# Patient Record
Sex: MALE | Race: BLACK OR AFRICAN AMERICAN | NOT HISPANIC OR LATINO | Employment: PART TIME | URBAN - METROPOLITAN AREA
[De-identification: names, ages, dates, MRNs, and addresses within clinical notes are randomized per-mention and may not be internally consistent; named-entity substitution may affect disease eponyms.]

---

## 2022-10-10 ENCOUNTER — OFFICE VISIT (OUTPATIENT)
Dept: FAMILY MEDICINE CLINIC | Facility: CLINIC | Age: 57
End: 2022-10-10

## 2022-10-10 ENCOUNTER — HOSPITAL ENCOUNTER (OUTPATIENT)
Dept: GENERAL RADIOLOGY | Facility: HOSPITAL | Age: 57
Discharge: HOME | End: 2022-10-10
Admitting: NURSE PRACTITIONER

## 2022-10-10 VITALS
SYSTOLIC BLOOD PRESSURE: 122 MMHG | DIASTOLIC BLOOD PRESSURE: 82 MMHG | WEIGHT: 183 LBS | OXYGEN SATURATION: 96 % | HEIGHT: 77 IN | BODY MASS INDEX: 21.61 KG/M2 | HEART RATE: 74 BPM

## 2022-10-10 DIAGNOSIS — R06.2 WHEEZING: ICD-10-CM

## 2022-10-10 DIAGNOSIS — M25.521 ELBOW PAIN, RIGHT: ICD-10-CM

## 2022-10-10 DIAGNOSIS — M25.551 HIP PAIN, ACUTE, RIGHT: ICD-10-CM

## 2022-10-10 DIAGNOSIS — J01.00 ACUTE NON-RECURRENT MAXILLARY SINUSITIS: Primary | ICD-10-CM

## 2022-10-10 PROCEDURE — 73502 X-RAY EXAM HIP UNI 2-3 VIEWS: CPT

## 2022-10-10 PROCEDURE — 71046 X-RAY EXAM CHEST 2 VIEWS: CPT

## 2022-10-10 PROCEDURE — 99203 OFFICE O/P NEW LOW 30 MIN: CPT | Performed by: NURSE PRACTITIONER

## 2022-10-10 RX ORDER — METHYLPREDNISOLONE 4 MG/1
TABLET ORAL
Qty: 1 EACH | Refills: 0 | Status: SHIPPED | OUTPATIENT
Start: 2022-10-10 | End: 2022-10-24

## 2022-10-10 RX ORDER — AMOXICILLIN 500 MG/1
1000 CAPSULE ORAL 2 TIMES DAILY
Qty: 40 CAPSULE | Refills: 0 | Status: SHIPPED | OUTPATIENT
Start: 2022-10-10 | End: 2022-10-20

## 2022-10-10 NOTE — PROGRESS NOTES
"Chief Complaint  Labs Only, Elbow Injury, and Hip Pain (Pt states he having chronic pain in right hip.)    Subjective        Stan Israel Jr. presents to [facility]  History of Present Illness  Pt is here today to establish care. He has history of HTN, anxiety/depression. He has not seen pcp in about 5 years. He states he has not been able to get medications and has been off of them for a while. He also states that he feels better since he is not taking medications.     He is experiencing pain in his elbow for about 3-4 days with weather changes. This pain occurs when he tries to move of lift something. It is a sharp pain and described as how it feels when you hit your funny bone. He has been taking ibuprofen and states that is sometimes helps. He is also experiencing pain in left hip that has been going on for about 4 months. Increased pain with walking or mobility. He reports pain that shoots to knee. No known injuries to elbow or hip.     Last 2-3 weeks he has been experiencing congestion and green mucus. In 2020 he had necrotizing pneumonia. He was hospitalized at [place]. He reports that he had negative covid test last week. He reports he is coughing up green mucus and has green nasal drainage.       Objective   Vital Signs:  /82   Pulse 74   Ht 194.3 cm (76.5\")   Wt 83 kg (183 lb)   SpO2 96%   BMI 21.99 kg/m²   Estimated body mass index is 21.99 kg/m² as calculated from the following:    Height as of this encounter: 194.3 cm (76.5\").    Weight as of this encounter: 83 kg (183 lb).    BMI is within normal parameters. No other follow-up for BMI required.    Physical Exam  Vitals reviewed.   Constitutional:       Appearance: Normal appearance.   HENT:      Nose: Congestion present.      Right Sinus: Maxillary sinus tenderness present.   Eyes:      Conjunctiva/sclera: Conjunctivae normal.   Cardiovascular:      Rate and Rhythm: Normal rate.      Heart sounds: " Normal heart sounds.   Pulmonary:      Breath sounds: Examination of the right-upper field reveals rhonchi. Examination of the right-middle field reveals rhonchi. Examination of the right-lower field reveals rhonchi. Rhonchi present.   Musculoskeletal:         General: Normal range of motion.      Cervical back: Normal range of motion and neck supple.   Skin:     General: Skin is warm.   Neurological:      Mental Status: He is alert and oriented to person, place, and time.   Psychiatric:         Mood and Affect: Mood normal.         Behavior: Behavior normal.         Thought Content: Thought content normal.        Result Review :                Assessment and Plan   Diagnoses and all orders for this visit:    1. Acute non-recurrent maxillary sinusitis (Primary)  -     amoxicillin (AMOXIL) 500 MG capsule; Take 2 capsules by mouth 2 (Two) Times a Day for 10 days.  Dispense: 40 capsule; Refill: 0    2. Elbow pain, right  -     methylPREDNISolone (MEDROL) 4 MG dose pack; Take as directed on package instructions.  Dispense: 1 each; Refill: 0    3. Hip pain, acute, right  -     XR Hip With or Without Pelvis 2 - 3 View Right; Future    4. Wheezing  -     XR Chest PA & Lateral; Future  -     methylPREDNISolone (MEDROL) 4 MG dose pack; Take as directed on package instructions.  Dispense: 1 each; Refill: 0             Follow Up   Return in about 2 weeks (around 10/24/2022) for Annual.  Patient was given instructions and counseling regarding his condition or for health maintenance advice. Please see specific information pulled into the AVS if appropriate.

## 2022-10-10 NOTE — PROGRESS NOTES
"Chief Complaint  Annual Exam, Labs Only, Elbow Injury, and Hip Pain (Pt states he having chronic pain in right hip.)    Subjective          Stan Israel presents to [facility] for   History of Present Illness    Objective   Vital Signs:   Vitals:    10/10/22 1112   BP: 122/82   Pulse: 74   SpO2: 96%   Weight: 83 kg (183 lb)   Height: 194.3 cm (76.5\")     Body mass index is 21.99 kg/m².    BMI is within normal parameters. No other follow-up for BMI required.          Physical Exam   Result Review :                Immunization History   Administered Date(s) Administered   • COVID-19 (PFIZER) PURPLE CAP 04/15/2021   • Flu Vaccine Split Quad 10/03/2019   • FluLaval/Fluzone >6mos 11/18/2014   • Influenza Injectable Mdck Pf Quad 11/18/2014, 11/29/2020   • Pneumococcal Polysaccharide (PPSV23) 11/18/2014   • Shingrix 09/11/2018, 12/11/2018   • Tdap 06/09/2018, 07/04/2018       Health Maintenance   Topic Date Due   • INFLUENZA VACCINE  08/01/2022   • TDAP/TD VACCINES (3 - Td or Tdap) 07/04/2028   • ZOSTER VACCINE  Completed            Assessment and Plan    There are no diagnoses linked to this encounter.    Counseling/anticipatory guidance: Nutrition, physical activity, healthy weight, dental health, mental health, eye exam, immunizations, screenings      Follow Up   No follow-ups on file.  Patient was given instructions and counseling regarding his condition or for health maintenance advice. Please see specific information pulled into the AVS if appropriate.        "

## 2022-10-11 ENCOUNTER — TELEPHONE (OUTPATIENT)
Dept: FAMILY MEDICINE CLINIC | Facility: CLINIC | Age: 57
End: 2022-10-11

## 2022-10-11 DIAGNOSIS — F17.200 CURRENT EVERY DAY SMOKER: Primary | ICD-10-CM

## 2022-10-11 DIAGNOSIS — M25.551 HIP PAIN, CHRONIC, RIGHT: ICD-10-CM

## 2022-10-11 DIAGNOSIS — G89.29 HIP PAIN, CHRONIC, RIGHT: ICD-10-CM

## 2022-10-11 NOTE — TELEPHONE ENCOUNTER
----- Message from ROSALIO Giraldo sent at 10/11/2022  8:43 AM EDT -----  I have the results of your x-rays. Your hip x-ray indicates some degenerative changes. This is likely due to arthritis. I would like to send you to Rheumatology for follow up.     The chest x-ray indicated some chronic changes as well. I would like to order a CT of the chest due to your smoking history. I will also place a referral to Pulmonology. I will place all of those orders today, and you should hear from someone in the next 1-2 weeks to make those appointments.   Hub can relay and document

## 2022-10-11 NOTE — TELEPHONE ENCOUNTER
Caller: Stan Israel Jr.    Relationship: Self    Best call back number: [phone]    What was the call regarding: HUB RELAYED MESSAGE TO BOTH PATIENT AND WIFE PER PATIENT REQUEST. PATIENT VERBALIZED UNDERSTANDING AND CONFIRMED THEY WILL EXPECT 3 CALLS TO SCHEDULE CT, PULMONOLOGY, AND RHEUMATOLOGY    Do you require a callback: NO, PATIENT WILL WAIT 1-2 WEEKS TO HEAR BACK TO SCHEDULE 3 APPOINTMENTS

## 2022-10-12 ENCOUNTER — TELEPHONE (OUTPATIENT)
Dept: FAMILY MEDICINE CLINIC | Facility: CLINIC | Age: 57
End: 2022-10-12

## 2022-10-12 DIAGNOSIS — J01.00 ACUTE NON-RECURRENT MAXILLARY SINUSITIS: ICD-10-CM

## 2022-10-12 RX ORDER — AMOXICILLIN 500 MG/1
1000 CAPSULE ORAL 2 TIMES DAILY
Qty: 40 CAPSULE | Refills: 0 | Status: CANCELLED | OUTPATIENT
Start: 2022-10-12 | End: 2022-10-22

## 2022-10-12 NOTE — TELEPHONE ENCOUNTER
Caller: Stan Israel Jr.    Relationship: Self    Best call back number: [phone]    What is the best time to reach you: ANYTIME    Who are you requesting to speak with (clinical staff, provider,  specific staff member): CLINICAL STAFF    Do you know the name of the person who called: SELF    What was the call regarding: PATIENT STATES THAT EVERY TIME HE TAKES THE amoxicillin (AMOXIL) 500 MG capsule HE VOMITS. PATIENT WOULD LIKE A CALL ABOUT THIS.    Do you require a callback: YES

## 2022-10-12 NOTE — TELEPHONE ENCOUNTER
Contacted patient to discuss further, he reports that he has taken 3 doses on a full stomach still experiences nausea and vomiting. He reports that his sinus symptoms have improved and he will stop meds       He will follow up as needed

## 2022-10-12 NOTE — TELEPHONE ENCOUNTER
Attempted to contact, no answer left message asking for return call    Is he taking on an empty stomach? He needs to eat prior to taking it. If he is eating and still vomiting, or he is having other symptoms after taking, I would recommend that he stop the medication.    Hub can relay and document.

## 2022-10-24 ENCOUNTER — OFFICE VISIT (OUTPATIENT)
Dept: FAMILY MEDICINE CLINIC | Facility: CLINIC | Age: 57
End: 2022-10-24

## 2022-10-24 ENCOUNTER — TELEPHONE (OUTPATIENT)
Dept: FAMILY MEDICINE CLINIC | Facility: CLINIC | Age: 57
End: 2022-10-24

## 2022-10-24 ENCOUNTER — APPOINTMENT (OUTPATIENT)
Dept: CT IMAGING | Facility: HOSPITAL | Age: 57
End: 2022-10-24

## 2022-10-24 ENCOUNTER — LAB (OUTPATIENT)
Dept: LAB | Facility: HOSPITAL | Age: 57
End: 2022-10-24

## 2022-10-24 VITALS
BODY MASS INDEX: 21.75 KG/M2 | OXYGEN SATURATION: 98 % | SYSTOLIC BLOOD PRESSURE: 118 MMHG | HEIGHT: 76 IN | HEART RATE: 79 BPM | DIASTOLIC BLOOD PRESSURE: 44 MMHG | WEIGHT: 178.6 LBS

## 2022-10-24 DIAGNOSIS — Z13.0 SCREENING FOR DEFICIENCY ANEMIA: ICD-10-CM

## 2022-10-24 DIAGNOSIS — Z23 NEED FOR IMMUNIZATION AGAINST INFLUENZA: ICD-10-CM

## 2022-10-24 DIAGNOSIS — Z00.00 ANNUAL PHYSICAL EXAM: Primary | ICD-10-CM

## 2022-10-24 DIAGNOSIS — T78.40XA ALLERGY, INITIAL ENCOUNTER: ICD-10-CM

## 2022-10-24 DIAGNOSIS — Z13.220 SCREENING FOR CHOLESTEROL LEVEL: ICD-10-CM

## 2022-10-24 DIAGNOSIS — Z12.11 SCREENING FOR COLON CANCER: ICD-10-CM

## 2022-10-24 DIAGNOSIS — R53.83 FATIGUE, UNSPECIFIED TYPE: ICD-10-CM

## 2022-10-24 DIAGNOSIS — Z00.00 ANNUAL PHYSICAL EXAM: ICD-10-CM

## 2022-10-24 DIAGNOSIS — Z71.6 ENCOUNTER FOR SMOKING CESSATION COUNSELING: ICD-10-CM

## 2022-10-24 LAB
25(OH)D3 SERPL-MCNC: 21.4 NG/ML (ref 30–100)
ALBUMIN SERPL-MCNC: 4.5 G/DL (ref 3.5–5.2)
ALBUMIN/GLOB SERPL: 1.4 G/DL
ALP SERPL-CCNC: 120 U/L (ref 39–117)
ALT SERPL W P-5'-P-CCNC: 19 U/L (ref 1–41)
ANION GAP SERPL CALCULATED.3IONS-SCNC: 10.7 MMOL/L (ref 5–15)
AST SERPL-CCNC: 21 U/L (ref 1–40)
BASOPHILS # BLD AUTO: 0.05 10*3/MM3 (ref 0–0.2)
BASOPHILS NFR BLD AUTO: 0.5 % (ref 0–1.5)
BILIRUB SERPL-MCNC: 0.6 MG/DL (ref 0–1.2)
BUN SERPL-MCNC: 7 MG/DL (ref 6–20)
BUN/CREAT SERPL: 8.2 (ref 7–25)
CALCIUM SPEC-SCNC: 9.9 MG/DL (ref 8.6–10.5)
CHLORIDE SERPL-SCNC: 101 MMOL/L (ref 98–107)
CHOLEST SERPL-MCNC: 213 MG/DL (ref 0–200)
CO2 SERPL-SCNC: 25.3 MMOL/L (ref 22–29)
CREAT SERPL-MCNC: 0.85 MG/DL (ref 0.76–1.27)
DEPRECATED RDW RBC AUTO: 41.5 FL (ref 37–54)
EGFRCR SERPLBLD CKD-EPI 2021: 102 ML/MIN/1.73
EOSINOPHIL # BLD AUTO: 0.06 10*3/MM3 (ref 0–0.4)
EOSINOPHIL NFR BLD AUTO: 0.6 % (ref 0.3–6.2)
ERYTHROCYTE [DISTWIDTH] IN BLOOD BY AUTOMATED COUNT: 12.4 % (ref 12.3–15.4)
GLOBULIN UR ELPH-MCNC: 3.2 GM/DL
GLUCOSE SERPL-MCNC: 93 MG/DL (ref 65–99)
HCT VFR BLD AUTO: 48.1 % (ref 37.5–51)
HDLC SERPL-MCNC: 91 MG/DL (ref 40–60)
HGB BLD-MCNC: 15.9 G/DL (ref 13–17.7)
IMM GRANULOCYTES # BLD AUTO: 0.03 10*3/MM3 (ref 0–0.05)
IMM GRANULOCYTES NFR BLD AUTO: 0.3 % (ref 0–0.5)
LDLC SERPL CALC-MCNC: 108 MG/DL (ref 0–100)
LDLC/HDLC SERPL: 1.16 {RATIO}
LYMPHOCYTES # BLD AUTO: 1.65 10*3/MM3 (ref 0.7–3.1)
LYMPHOCYTES NFR BLD AUTO: 15.7 % (ref 19.6–45.3)
MCH RBC QN AUTO: 30.5 PG (ref 26.6–33)
MCHC RBC AUTO-ENTMCNC: 33.1 G/DL (ref 31.5–35.7)
MCV RBC AUTO: 92.3 FL (ref 79–97)
MONOCYTES # BLD AUTO: 0.76 10*3/MM3 (ref 0.1–0.9)
MONOCYTES NFR BLD AUTO: 7.2 % (ref 5–12)
NEUTROPHILS NFR BLD AUTO: 7.97 10*3/MM3 (ref 1.7–7)
NEUTROPHILS NFR BLD AUTO: 75.7 % (ref 42.7–76)
NRBC BLD AUTO-RTO: 0 /100 WBC (ref 0–0.2)
PLATELET # BLD AUTO: 252 10*3/MM3 (ref 140–450)
PMV BLD AUTO: 11.2 FL (ref 6–12)
POTASSIUM SERPL-SCNC: 4.7 MMOL/L (ref 3.5–5.2)
PROT SERPL-MCNC: 7.7 G/DL (ref 6–8.5)
RBC # BLD AUTO: 5.21 10*6/MM3 (ref 4.14–5.8)
SODIUM SERPL-SCNC: 137 MMOL/L (ref 136–145)
TRIGL SERPL-MCNC: 83 MG/DL (ref 0–150)
TSH SERPL DL<=0.05 MIU/L-ACNC: 0.99 UIU/ML (ref 0.27–4.2)
VLDLC SERPL-MCNC: 14 MG/DL (ref 5–40)
WBC NRBC COR # BLD: 10.52 10*3/MM3 (ref 3.4–10.8)

## 2022-10-24 PROCEDURE — 80061 LIPID PANEL: CPT

## 2022-10-24 PROCEDURE — 99396 PREV VISIT EST AGE 40-64: CPT | Performed by: NURSE PRACTITIONER

## 2022-10-24 PROCEDURE — 82306 VITAMIN D 25 HYDROXY: CPT

## 2022-10-24 PROCEDURE — 84443 ASSAY THYROID STIM HORMONE: CPT

## 2022-10-24 PROCEDURE — 85025 COMPLETE CBC W/AUTO DIFF WBC: CPT

## 2022-10-24 PROCEDURE — 80053 COMPREHEN METABOLIC PANEL: CPT

## 2022-10-24 RX ORDER — FLUTICASONE PROPIONATE 50 MCG
2 SPRAY, SUSPENSION (ML) NASAL DAILY
Qty: 18.2 ML | Refills: 1 | Status: SHIPPED | OUTPATIENT
Start: 2022-10-24 | End: 2022-12-19

## 2022-10-24 RX ORDER — ALBUTEROL SULFATE 90 UG/1
2 AEROSOL, METERED RESPIRATORY (INHALATION) EVERY 4 HOURS PRN
Qty: 18 G | Refills: 1 | Status: SHIPPED | OUTPATIENT
Start: 2022-10-24 | End: 2023-02-10 | Stop reason: SDUPTHER

## 2022-10-24 RX ORDER — FEXOFENADINE HCL 180 MG/1
180 TABLET ORAL DAILY
Qty: 30 TABLET | Refills: 0 | Status: SHIPPED | OUTPATIENT
Start: 2022-10-24 | End: 2022-11-21 | Stop reason: SDUPTHER

## 2022-10-24 RX ORDER — NICOTINE 21 MG/24HR
1 PATCH, TRANSDERMAL 24 HOURS TRANSDERMAL EVERY 24 HOURS
Qty: 28 PATCH | Refills: 0 | Status: SHIPPED | OUTPATIENT
Start: 2022-10-24 | End: 2022-11-14

## 2022-10-24 NOTE — TELEPHONE ENCOUNTER
Caller: Stan Israel Jr.    Relationship: Self    Best call back number: [phone]    What medication are you requesting: SOMETHING FOR RESTLESS LEG SYNDROME    If a prescription is needed, what is your preferred pharmacy and phone number:    [facility] [phone]  Additional notes:  AT TODAY'S APPOINTMENT WAS DISCUSSED POSSIBLY PRESCRIBING SOMETHING FOR PATIENT AND HIS RESTLESS LEG SYNDROME HOWEVER NOTHING HAS BEEN SENT TO PHARMACY. PLEASE ADVISE

## 2022-10-24 NOTE — PROGRESS NOTES
"Chief Complaint  Annual Exam, Nasal Congestion (Has green mucus.), Abdominal Pain (Lt side,feels like something is sitting on top of something. Happens when standing and sitting. When smoking a cigar pain worsens. ), and Leg Pain (Lt leg twitches at night when a sleep. )    Subjective          Stan Israel Jr. presents to [facility] for   History of Present Illness  Pt is here today for annual physical. He continues to feel discomfort in the left lower rib cage. He had a chest x-ray previously. Chest CT was ordered. It has been authorized, but not scheduled yet. He is a smoker, but would like help quitting. Will prescribe nicotine patches today. He has an appt with Pulmonary on Monday.   He is having concerns with restless leg syndrome.     Pt needs flu vaccine and PCV 20. Left today before getting vaccines. He can do a walk in visit for both. He does not wish to do covid at this time.    Objective   Vital Signs:   Vitals:    10/24/22 0916   BP: 118/44   Pulse: 79   SpO2: 98%   Weight: 81 kg (178 lb 9.6 oz)   Height: 194.3 cm (76.5\")   PainSc:   7   PainLoc: Abdomen     Body mass index is 21.46 kg/m².    BMI is within normal parameters. No other follow-up for BMI required.    Physical Exam  Vitals reviewed.   Constitutional:       Appearance: Normal appearance. He is normal weight.   HENT:      Right Ear: Tympanic membrane, ear canal and external ear normal.      Left Ear: Tympanic membrane, ear canal and external ear normal.      Nose: Nose normal.      Mouth/Throat:      Mouth: Mucous membranes are moist.      Pharynx: Oropharynx is clear.   Eyes:      Extraocular Movements: Extraocular movements intact.      Conjunctiva/sclera: Conjunctivae normal.      Pupils: Pupils are equal, round, and reactive to light.   Cardiovascular:      Rate and Rhythm: Normal rate and regular rhythm.      Heart sounds: Normal heart sounds.   Pulmonary:      Effort: Pulmonary effort is normal.      " Breath sounds: Normal breath sounds.   Abdominal:      General: Abdomen is flat. Bowel sounds are normal.      Palpations: Abdomen is soft.      Tenderness: There is abdominal tenderness in the left upper quadrant and left lower quadrant.   Musculoskeletal:         General: Normal range of motion.      Cervical back: Normal range of motion and neck supple.   Skin:     General: Skin is warm.   Neurological:      Mental Status: He is alert and oriented to person, place, and time.   Psychiatric:         Mood and Affect: Mood normal.         Behavior: Behavior normal.         Thought Content: Thought content normal.        Result Review :                Immunization History   Administered Date(s) Administered   • COVID-19 (PFIZER) PURPLE CAP 04/15/2021   • Flu Vaccine Split Quad 10/03/2019   • FluLaval/Fluzone >6mos 11/18/2014   • Influenza Injectable Mdck Pf Quad 11/18/2014, 11/29/2020   • Influenza, Unspecified 10/03/2019   • Pneumococcal Polysaccharide (PPSV23) 11/18/2014   • Shingrix 09/11/2018, 12/11/2018   • Tdap 06/09/2018, 07/04/2018     Health Maintenance   Topic Date Due   • INFLUENZA VACCINE  08/01/2022   • TDAP/TD VACCINES (3 - Td or Tdap) 07/04/2028   • ZOSTER VACCINE  Completed        Assessment and Plan    Diagnoses and all orders for this visit:    1. Annual physical exam (Primary)  -     Comprehensive Metabolic Panel; Future    2. Need for immunization against influenza  -     Cancel: FluLaval/Fluzone >6 mos ([id number])    3. Screening for colon cancer  -     Amb referral for Screening Colonoscopy    4. Allergy, initial encounter  -     albuterol sulfate  (90 Base) MCG/ACT inhaler; Inhale 2 puffs Every 4 (Four) Hours As Needed for Wheezing.  Dispense: 18 g; Refill: 1  -     fexofenadine (Allegra Allergy) 180 MG tablet; Take 1 tablet by mouth Daily.  Dispense: 30 tablet; Refill: 0  -     fluticasone (Flonase) 50 MCG/ACT nasal spray; 2 sprays into the nostril(s) as directed by provider Daily.   Dispense: 18.2 mL; Refill: 1    5. Fatigue, unspecified type  -     TSH Rfx On Abnormal To Free T4; Future  -     Vitamin D,25-Hydroxy; Future    6. Screening for deficiency anemia  -     CBC Auto Differential; Future    7. Screening for cholesterol level  -     Lipid Panel; Future    8. Encounter for smoking cessation counseling    Other orders  -     nicotine (NICODERM CQ) 21 MG/24HR patch; Place 1 patch on the skin as directed by provider Daily.  Dispense: 28 patch; Refill: 0      Counseling/anticipatory guidance: Nutrition, physical activity, healthy weight, dental health, mental health, eye exam, immunizations, screenings    Discussed age appropriate screenings and immunizations. Pt would like to get immunization, except covid. Will order colonoscopy today.      Follow Up   Return in about 3 months (around 1/24/2023).  Patient was given instructions and counseling regarding his condition or for health maintenance advice. Please see specific information pulled into the AVS if appropriate.

## 2022-10-24 NOTE — TELEPHONE ENCOUNTER
Contacted pt & relayed PCP's message. Pt verbalized understanding and did not have any additional questions at this time.         Sara Claros APRN  You 4 minutes ago (3:47 PM)     We are waiting on lab results. I will let him know once I get the lab results back.

## 2022-10-26 RX ORDER — ROPINIROLE 0.25 MG/1
0.25 TABLET, FILM COATED ORAL NIGHTLY
Qty: 30 TABLET | Refills: 0 | Status: SHIPPED | OUTPATIENT
Start: 2022-10-26 | End: 2022-11-21 | Stop reason: SDUPTHER

## 2022-10-28 ENCOUNTER — TELEPHONE (OUTPATIENT)
Dept: FAMILY MEDICINE CLINIC | Facility: CLINIC | Age: 57
End: 2022-10-28

## 2022-10-31 PROBLEM — J43.2 CENTRILOBULAR EMPHYSEMA: Status: ACTIVE | Noted: 2022-10-31

## 2022-11-07 ENCOUNTER — HOSPITAL ENCOUNTER (OUTPATIENT)
Dept: CT IMAGING | Facility: HOSPITAL | Age: 57
Discharge: HOME | End: 2022-11-07
Admitting: NURSE PRACTITIONER

## 2022-11-07 DIAGNOSIS — F17.200 CURRENT EVERY DAY SMOKER: ICD-10-CM

## 2022-11-07 PROCEDURE — 71250 CT THORAX DX C-: CPT

## 2022-11-14 ENCOUNTER — OFFICE VISIT (OUTPATIENT)
Dept: PULMONOLOGY | Facility: CLINIC | Age: 57
End: 2022-11-14

## 2022-11-14 VITALS
DIASTOLIC BLOOD PRESSURE: 72 MMHG | TEMPERATURE: 97.4 F | HEART RATE: 82 BPM | OXYGEN SATURATION: 97 % | BODY MASS INDEX: 21.87 KG/M2 | HEIGHT: 77 IN | SYSTOLIC BLOOD PRESSURE: 128 MMHG | WEIGHT: 185.2 LBS

## 2022-11-14 DIAGNOSIS — Z72.0 TOBACCO ABUSE: ICD-10-CM

## 2022-11-14 DIAGNOSIS — J43.2 CENTRILOBULAR EMPHYSEMA: Primary | ICD-10-CM

## 2022-11-14 DIAGNOSIS — J30.89 NON-SEASONAL ALLERGIC RHINITIS, UNSPECIFIED TRIGGER: ICD-10-CM

## 2022-11-14 DIAGNOSIS — R05.3 CHRONIC COUGH: ICD-10-CM

## 2022-11-14 PROCEDURE — 94726 PLETHYSMOGRAPHY LUNG VOLUMES: CPT | Performed by: INTERNAL MEDICINE

## 2022-11-14 PROCEDURE — 99204 OFFICE O/P NEW MOD 45 MIN: CPT | Performed by: INTERNAL MEDICINE

## 2022-11-14 PROCEDURE — 94729 DIFFUSING CAPACITY: CPT | Performed by: INTERNAL MEDICINE

## 2022-11-14 PROCEDURE — 94375 RESPIRATORY FLOW VOLUME LOOP: CPT | Performed by: INTERNAL MEDICINE

## 2022-11-14 RX ORDER — TIOTROPIUM BROMIDE AND OLODATEROL 3.124; 2.736 UG/1; UG/1
2 SPRAY, METERED RESPIRATORY (INHALATION)
Qty: 4 G | Refills: 11 | Status: SHIPPED | OUTPATIENT
Start: 2022-11-14

## 2022-11-14 NOTE — PROGRESS NOTES
New Patient Pulmonary Office Visit      Patient Name: Stan Israel Jr.    Referring Physician: Sara Claros APRN    Chief Complaint:    Chief Complaint   Patient presents with   • Shortness of Breath       History of Present Illness: Stan Israel Jr. is a 57 y.o. male who is here today to establish care with Pulmonary.  Patient's past medical history significant for allergic rhinitis, tobacco abuse, history of necrotizing pneumonia (2020) and emphysema.  He was referred to pulmonary for management of emphysema.  Patient states that he has a long smoking history of 30-pack-year smoking history, that has been split mainly between cigars and cigarettes but previously was smoking 1 pack/day but when they got too expensive he switched over to cigars a couple years ago.  He states that he is short of breath when he exerts himself.  This is been like this for couple years now.  It all started after he had a bad case of necrotizing pneumonia back in 2020.  He coughs on a daily basis and has mucus production.  Denies any chest pain, nausea, fever, or chills.  No other acute complaints.    Review of Systems:   Review of Systems   Constitutional: Negative for activity change, appetite change, chills and diaphoresis.   HENT: Negative for congestion, postnasal drip, sinus pressure and voice change.    Eyes: Negative for blurred vision.   Respiratory: Positive for cough and shortness of breath. Negative for wheezing.    Cardiovascular: Negative for chest pain.   Gastrointestinal: Negative for abdominal pain.   Musculoskeletal: Negative for myalgias.   Skin: Negative for color change and dry skin.   Allergic/Immunologic: Negative for environmental allergies.   Neurological: Negative for weakness and confusion.   Hematological: Negative for adenopathy.   Psychiatric/Behavioral: Negative for sleep disturbance and depressed mood.       Past Medical History:   Past Medical History:   Diagnosis Date   • Anxiety    •  "Depression        Past Surgical History:   Past Surgical History:   Procedure Laterality Date   • HERNIA REPAIR         Family History:   Family History   Problem Relation Age of Onset   • Cancer Mother    • Cancer Father    • Cancer Sister        Social History:   Social History     Socioeconomic History   • Marital status: Single   Tobacco Use   • Smoking status: Every Day     Packs/day: 1.00     Years: 30.00     Pack years: 30.00     Types: Cigars, Cigarettes     Start date: 1/1/1982     Passive exposure: Never   • Smokeless tobacco: Never   Vaping Use   • Vaping Use: Never used   Substance and Sexual Activity   • Alcohol use: Yes     Alcohol/week: 2.0 standard drinks     Types: 2 Cans of beer per week   • Drug use: Yes     Types: Marijuana   • Sexual activity: Defer       Medications:     Current Outpatient Medications:   •  albuterol sulfate  (90 Base) MCG/ACT inhaler, Inhale 2 puffs Every 4 (Four) Hours As Needed for Wheezing., Disp: 18 g, Rfl: 1  •  fexofenadine (Allegra Allergy) 180 MG tablet, Take 1 tablet by mouth Daily., Disp: 30 tablet, Rfl: 0  •  fluticasone (Flonase) 50 MCG/ACT nasal spray, 2 sprays into the nostril(s) as directed by provider Daily., Disp: 18.2 mL, Rfl: 1  •  rOPINIRole (REQUIP) 0.25 MG tablet, Take 1 tablet by mouth Every Night. Take 1 hour before bedtime., Disp: 30 tablet, Rfl: 0  •  tiotropium bromide-olodaterol (Stiolto Respimat) 2.5-2.5 MCG/ACT aerosol solution inhaler, Inhale 2 puffs Daily., Disp: 4 g, Rfl: 11    Allergies:   No Known Allergies    Physical Exam:  Vital Signs:   Vitals:    11/14/22 0844   BP: 128/72   Pulse: 82   Temp: 97.4 °F (36.3 °C)   SpO2: 97%  Comment: resting, room air   Weight: 84 kg (185 lb 3.2 oz)   Height: 194.3 cm (76.5\")       Physical Exam  Vitals and nursing note reviewed.   Constitutional:       General: He is not in acute distress.     Appearance: He is well-developed and normal weight. He is not ill-appearing or toxic-appearing. "   Cardiovascular:      Rate and Rhythm: Normal rate and regular rhythm.      Pulses: Normal pulses.      Heart sounds: Normal heart sounds. No murmur heard.    No gallop.   Pulmonary:      Effort: Pulmonary effort is normal.      Breath sounds: Normal breath sounds. No wheezing, rhonchi or rales.   Musculoskeletal:      Right lower leg: No edema.      Left lower leg: No edema.   Neurological:      Mental Status: He is alert.         Immunization History   Administered Date(s) Administered   • COVID-19 (PFIZER) PURPLE CAP 04/15/2021   • Flu Vaccine Split Quad 10/03/2019   • FluLaval/Fluzone >6mos 11/18/2014   • Influenza Injectable Mdck Pf Quad 11/18/2014, 11/29/2020   • Influenza, Unspecified 10/03/2019, 11/05/2022   • Pneumococcal Polysaccharide (PPSV23) 11/18/2014   • Shingrix 09/11/2018, 12/11/2018   • Tdap 06/09/2018, 07/04/2018       Results Review:   - I personally reviewed the pts imaging from CT scan of the chest on 11/7/2022 showed bilateral upper lobe predominant paraseptal emphysema with some linear scarring most notable in the right middle and right lower lobes.  - I personally reviewed the pts PFT from 11/14/2022 showed no obstruction or restriction with a mildly reduced DLCO, FVC 76% FEV1 72%, total lung capacity 84%, DLCO 63%.  - I personally reviewed the pts chart with regards to evaluation by his PCP and recent physical.    Assessment / Plan:   1. Centrilobular emphysema (HCC) (Primary)  -Patient has gold stage 3 class A COPD, PFTs did not show significant obstruction but he has clear emphysema on the CT scan of the chest and symptoms consistent with COPD.  -I will start the patient on Stiolto 2 puffs once daily in addition to albuterol for medication management  -6-minute walk and latest PFTs as noted above  -Patient counseled on monitoring for COPD exacerbation and treatment plan  -Recommend 30 minutes of cardiovascular exercise per day    2. Chronic cough  -Plan to treat allergies and emphysema  as noted.  If there is not any improvement then I will go through a much larger evaluation of chronic cough including postnasal drip work-up and reflux management.    3. Tobacco abuse  - In this visit the patient was advised to stop smoking and was offered tobacco cessation measures and resources, including NRT and/or medication intervention.  I specifically discussed with the patient psychological components to quitting smoking which includes determining why the patient wants to quit smoking, identifying smoking triggers, and figuring out ways to replace those triggers.  Greater than 10 minutes was spent on face-to-face counseling regarding smoking cessation.  Plan to follow-up at the next visit on status cessation success.  Patient was willing to quit at this time.  He is currently on nicotine patches which I would asked him to going to continue.  Goal is to have complete tobacco cessation within 1 year.    4. Non-seasonal allergic rhinitis, unspecified trigger  -Continue Flonase and Allegra, will escalate therapy if the cough does not improve with treatment of his underlying emphysema.      Follow Up:   Return in about 6 months (around 5/14/2023).     KATHIE Kumari, DO  Pulmonary and Critical Care Medicine  Note Electronically Signed    Part of this note may be an electronic transcription/translation of spoken language to printed text using the Dragon Dictation System.

## 2022-11-18 DIAGNOSIS — T78.40XA ALLERGY, INITIAL ENCOUNTER: ICD-10-CM

## 2022-11-18 RX ORDER — ROPINIROLE 0.25 MG/1
0.25 TABLET, FILM COATED ORAL NIGHTLY
Qty: 30 TABLET | Refills: 0 | Status: CANCELLED | OUTPATIENT
Start: 2022-11-18

## 2022-11-18 RX ORDER — FEXOFENADINE HCL 180 MG/1
180 TABLET ORAL DAILY
Qty: 30 TABLET | Refills: 0 | Status: CANCELLED | OUTPATIENT
Start: 2022-11-18

## 2022-11-18 NOTE — TELEPHONE ENCOUNTER
Caller: Stan Israel Jr.    Relationship: Self    Best call back number: [phone]    Requested Prescriptions:   Requested Prescriptions     Pending Prescriptions Disp Refills   • fexofenadine (Allegra Allergy) 180 MG tablet 30 tablet 0     Sig: Take 1 tablet by mouth Daily.   • rOPINIRole (REQUIP) 0.25 MG tablet 30 tablet 0     Sig: Take 1 tablet by mouth Every Night. Take 1 hour before bedtime.      Pharmacy where request should be sent: [id number] [address] AT [facility] [address] [facility] [place] [phone] [facility] [phone] FX     Additional details provided by patient:     PATIENT HAS LESS THAN A 3 DAY SUPPLY LEFT OF BOTH MEDICATIONS     Does the patient have less than a 3 day supply:  [x] Yes  [] No    Milly Duran Rep   11/18/22 14:33 EST

## 2022-11-21 ENCOUNTER — OFFICE VISIT (OUTPATIENT)
Dept: FAMILY MEDICINE CLINIC | Facility: CLINIC | Age: 57
End: 2022-11-21

## 2022-11-21 VITALS
HEIGHT: 76 IN | OXYGEN SATURATION: 97 % | SYSTOLIC BLOOD PRESSURE: 118 MMHG | BODY MASS INDEX: 22.82 KG/M2 | WEIGHT: 187.4 LBS | HEART RATE: 70 BPM | DIASTOLIC BLOOD PRESSURE: 78 MMHG

## 2022-11-21 DIAGNOSIS — R05.1 ACUTE COUGH: Primary | ICD-10-CM

## 2022-11-21 DIAGNOSIS — T78.40XA ALLERGY, INITIAL ENCOUNTER: ICD-10-CM

## 2022-11-21 DIAGNOSIS — S61.211A LACERATION OF LEFT INDEX FINGER WITHOUT FOREIGN BODY WITHOUT DAMAGE TO NAIL, INITIAL ENCOUNTER: ICD-10-CM

## 2022-11-21 DIAGNOSIS — J01.00 ACUTE NON-RECURRENT MAXILLARY SINUSITIS: ICD-10-CM

## 2022-11-21 DIAGNOSIS — G25.81 RESTLESS LEG SYNDROME: ICD-10-CM

## 2022-11-21 LAB
EXPIRATION DATE: NORMAL
FLUAV AG UPPER RESP QL IA.RAPID: NOT DETECTED
FLUBV AG UPPER RESP QL IA.RAPID: NOT DETECTED
INTERNAL CONTROL: NORMAL
Lab: NORMAL
SARS-COV-2 AG UPPER RESP QL IA.RAPID: NOT DETECTED

## 2022-11-21 PROCEDURE — 87428 SARSCOV & INF VIR A&B AG IA: CPT | Performed by: NURSE PRACTITIONER

## 2022-11-21 PROCEDURE — 99213 OFFICE O/P EST LOW 20 MIN: CPT | Performed by: NURSE PRACTITIONER

## 2022-11-21 RX ORDER — ROPINIROLE 0.5 MG/1
0.5 TABLET, FILM COATED ORAL NIGHTLY
Qty: 30 TABLET | Refills: 0 | Status: SHIPPED | OUTPATIENT
Start: 2022-11-21 | End: 2022-12-19

## 2022-11-21 RX ORDER — FEXOFENADINE HCL 180 MG/1
180 TABLET ORAL DAILY
Qty: 30 TABLET | Refills: 2 | Status: SHIPPED | OUTPATIENT
Start: 2022-11-21 | End: 2023-02-10 | Stop reason: SDUPTHER

## 2022-11-21 RX ORDER — AMOXICILLIN AND CLAVULANATE POTASSIUM 875; 125 MG/1; MG/1
1 TABLET, FILM COATED ORAL 2 TIMES DAILY
Qty: 14 TABLET | Refills: 0 | Status: SHIPPED | OUTPATIENT
Start: 2022-11-21 | End: 2022-11-28

## 2022-11-21 NOTE — PROGRESS NOTES
"Chief Complaint  URI (Pt states hes having a rattle noise in his chest when he breaths ), Nasal Congestion, and Cough    Subjective        Stan PAM Israel Jr. presents to [facility]  History of Present Illness  Pt is here today with concerns of nasal congestion and cough. He reports green nasal drainage over the weekend from right nasal passage. Symptoms started about 5-7 days ago. He has experienced chills and sweating over the weekend.     Pt also has an injury to his left index finger. He was cutting something and sliced the tip of the finger. He has been using peroxide and antibiotic ointment to treat. Wound looks clean and no s/s of infection present. Pt advised to stop peroxide and continue with antibiotic ointment for treatment.     Pt was started on ropinirole recently for restless leg. He states that initial dose helped for a couple of weeks, but he feels he needs an increased dose now. Will re-send at 0.5 Mg dose.     Objective   Vital Signs:  /78   Pulse 70   Ht 194.3 cm (76.5\")   Wt 85 kg (187 lb 6.4 oz)   SpO2 97%   BMI 22.52 kg/m²   Estimated body mass index is 22.52 kg/m² as calculated from the following:    Height as of this encounter: 194.3 cm (76.5\").    Weight as of this encounter: 85 kg (187 lb 6.4 oz).    BMI is within normal parameters. No other follow-up for BMI required.    Physical Exam  Vitals reviewed.   Constitutional:       Appearance: Normal appearance.   HENT:      Right Ear: Tympanic membrane, ear canal and external ear normal.      Left Ear: Tympanic membrane, ear canal and external ear normal.      Nose: Nasal tenderness, congestion and rhinorrhea present.      Right Turbinates: Swollen.      Right Sinus: Maxillary sinus tenderness and frontal sinus tenderness present.      Mouth/Throat:      Pharynx: Oropharyngeal exudate present.   Eyes:      Conjunctiva/sclera: Conjunctivae normal.   Cardiovascular:      Rate and Rhythm: Normal rate " and regular rhythm.      Heart sounds: Normal heart sounds.   Pulmonary:      Effort: Pulmonary effort is normal.      Breath sounds: Normal breath sounds.   Musculoskeletal:         General: Normal range of motion.      Cervical back: Neck supple.   Skin:     General: Skin is warm.      Findings: Lesion present.   Neurological:      Mental Status: He is alert and oriented to person, place, and time.   Psychiatric:         Mood and Affect: Mood normal.         Behavior: Behavior normal.         Thought Content: Thought content normal.        Result Review :                Assessment and Plan   Diagnoses and all orders for this visit:    1. Acute cough (Primary)  -     POCT SARS-CoV-2 Antigen ISIDRO    2. Acute non-recurrent maxillary sinusitis  -     amoxicillin-clavulanate (Augmentin) 875-125 MG per tablet; Take 1 tablet by mouth 2 (Two) Times a Day for 7 days.  Dispense: 14 tablet; Refill: 0    3. Allergy, initial encounter  -     fexofenadine (Allegra Allergy) 180 MG tablet; Take 1 tablet by mouth Daily.  Dispense: 30 tablet; Refill: 2    4. Restless leg syndrome  -     rOPINIRole (REQUIP) 0.5 MG tablet; Take 1 tablet by mouth Every Night. Take 1 hour before bedtime.  Dispense: 30 tablet; Refill: 0    5. Laceration of left index finger without foreign body without damage to nail, initial encounter     - continuse antibiotic ointment and band-aid dressings.          Follow Up   No follow-ups on file.  Patient was given instructions and counseling regarding his condition or for health maintenance advice. Please see specific information pulled into the AVS if appropriate.

## 2022-11-23 RX ORDER — ROPINIROLE 0.25 MG/1
TABLET, FILM COATED ORAL
Qty: 30 TABLET | Refills: 0 | OUTPATIENT
Start: 2022-11-23

## 2022-12-17 DIAGNOSIS — T78.40XA ALLERGY, INITIAL ENCOUNTER: ICD-10-CM

## 2022-12-18 DIAGNOSIS — G25.81 RESTLESS LEG SYNDROME: ICD-10-CM

## 2022-12-19 RX ORDER — FLUTICASONE PROPIONATE 50 MCG
SPRAY, SUSPENSION (ML) NASAL
Qty: 16 G | Refills: 1 | Status: SHIPPED | OUTPATIENT
Start: 2022-12-19 | End: 2023-02-10 | Stop reason: SDUPTHER

## 2022-12-19 RX ORDER — ROPINIROLE 0.5 MG/1
TABLET, FILM COATED ORAL
Qty: 30 TABLET | Refills: 0 | Status: SHIPPED | OUTPATIENT
Start: 2022-12-19 | End: 2023-01-23

## 2022-12-19 NOTE — TELEPHONE ENCOUNTER
Rx Refill Note  Requested Prescriptions     Pending Prescriptions Disp Refills   • rOPINIRole (REQUIP) 0.5 MG tablet [Pharmacy Med Name: ROPINIROLE 0.5MG TABLETS] 30 tablet 0     Sig: TAKE 1 TABLET BY MOUTH EVERY NIGHT 1 HOUR BEFORE BEDTIME      Last office visit with prescribing clinician: 11/21/2022   Last telemedicine visit with prescribing clinician: Visit date not found   Next office visit with prescribing clinician: Visit date not found                         Would you like a call back once the refill request has been completed: [] Yes [] No    If the office needs to give you a call back, can they leave a voicemail: [] Yes [] No    Humza Roche MA  12/19/22, 09:59 EST

## 2022-12-19 NOTE — TELEPHONE ENCOUNTER
Rx Refill Note  Requested Prescriptions     Pending Prescriptions Disp Refills   • fluticasone (FLONASE) 50 MCG/ACT nasal spray [Pharmacy Med Name: FLUTICASONE 50MCG NASAL SP (120) RX] 16 g      Sig: SHAKE GENTLY AND INHALE 2 SPRAYS IN EACH NOSTRIL DAILY AS DIRECTED BY PROVIDER      Last office visit with prescribing clinician: 11/21/2022   Last telemedicine visit with prescribing clinician:    Next office visit with prescribing clinician:                          Would you like a call back once the refill request has been completed: [] Yes [] No    If the office needs to give you a call back, can they leave a voicemail: [] Yes [] No    Humza Roche MA  12/19/22, 09:57 EST

## 2023-01-16 ENCOUNTER — OUTSIDE FACILITY SERVICE (OUTPATIENT)
Dept: GASTROENTEROLOGY | Facility: CLINIC | Age: 58
End: 2023-01-16
Payer: COMMERCIAL

## 2023-01-16 PROCEDURE — 45378 DIAGNOSTIC COLONOSCOPY: CPT | Performed by: INTERNAL MEDICINE

## 2023-01-16 PROCEDURE — 99152 MOD SED SAME PHYS/QHP 5/>YRS: CPT | Performed by: INTERNAL MEDICINE

## 2023-01-22 DIAGNOSIS — G25.81 RESTLESS LEG SYNDROME: ICD-10-CM

## 2023-01-23 RX ORDER — ROPINIROLE 0.5 MG/1
TABLET, FILM COATED ORAL
Qty: 30 TABLET | Refills: 2 | Status: SHIPPED | OUTPATIENT
Start: 2023-01-23

## 2023-01-23 NOTE — TELEPHONE ENCOUNTER
Rx Refill Note  Requested Prescriptions     Pending Prescriptions Disp Refills   • rOPINIRole (REQUIP) 0.5 MG tablet [Pharmacy Med Name: ROPINIROLE 0.5MG TABLETS] 30 tablet 0     Sig: TAKE 1 TABLET BY MOUTH EVERY NIGHT 1 HOUR BEFORE BEDTIME      Last office visit with prescribing clinician: 11/21/2022   Last telemedicine visit with prescribing clinician: Visit date not found   Next office visit with prescribing clinician: Visit date not found   April DINO Valdez  01/23/23, 09:06 EST

## 2023-01-25 ENCOUNTER — APPOINTMENT (OUTPATIENT)
Dept: GENERAL RADIOLOGY | Facility: HOSPITAL | Age: 58
End: 2023-01-25
Payer: COMMERCIAL

## 2023-01-25 ENCOUNTER — HOSPITAL ENCOUNTER (EMERGENCY)
Facility: HOSPITAL | Age: 58
Discharge: HOME | End: 2023-01-25
Attending: EMERGENCY MEDICINE | Admitting: EMERGENCY MEDICINE
Payer: COMMERCIAL

## 2023-01-25 ENCOUNTER — TELEPHONE (OUTPATIENT)
Dept: FAMILY MEDICINE CLINIC | Facility: CLINIC | Age: 58
End: 2023-01-25
Payer: COMMERCIAL

## 2023-01-25 VITALS
DIASTOLIC BLOOD PRESSURE: 89 MMHG | WEIGHT: 180 LBS | RESPIRATION RATE: 18 BRPM | OXYGEN SATURATION: 96 % | HEIGHT: 77 IN | TEMPERATURE: 98.6 F | HEART RATE: 85 BPM | BODY MASS INDEX: 21.25 KG/M2 | SYSTOLIC BLOOD PRESSURE: 132 MMHG

## 2023-01-25 DIAGNOSIS — J18.9 PNEUMONIA OF RIGHT MIDDLE LOBE DUE TO INFECTIOUS ORGANISM: ICD-10-CM

## 2023-01-25 DIAGNOSIS — R20.2 PARESTHESIA: Primary | ICD-10-CM

## 2023-01-25 DIAGNOSIS — J39.8 CONGESTION OF UPPER RESPIRATORY TRACT: ICD-10-CM

## 2023-01-25 DIAGNOSIS — R05.1 ACUTE COUGH: ICD-10-CM

## 2023-01-25 DIAGNOSIS — R93.89 ABNORMAL CHEST X-RAY: ICD-10-CM

## 2023-01-25 LAB
FLUAV RNA RESP QL NAA+PROBE: NOT DETECTED
FLUBV RNA RESP QL NAA+PROBE: NOT DETECTED
SARS-COV-2 RNA RESP QL NAA+PROBE: NOT DETECTED

## 2023-01-25 PROCEDURE — 99284 EMERGENCY DEPT VISIT MOD MDM: CPT

## 2023-01-25 PROCEDURE — 99283 EMERGENCY DEPT VISIT LOW MDM: CPT

## 2023-01-25 PROCEDURE — 71045 X-RAY EXAM CHEST 1 VIEW: CPT

## 2023-01-25 PROCEDURE — 87636 SARSCOV2 & INF A&B AMP PRB: CPT | Performed by: EMERGENCY MEDICINE

## 2023-01-25 RX ORDER — DOXYCYCLINE 100 MG/1
100 CAPSULE ORAL 2 TIMES DAILY
Qty: 14 CAPSULE | Refills: 0 | Status: SHIPPED | OUTPATIENT
Start: 2023-01-25

## 2023-01-25 NOTE — TELEPHONE ENCOUNTER
Caller: Stan Israel Jr.    Relationship: Self    Best call back number: [phone]    What medication are you requesting: COUGH SYRUP    What are your current symptoms: COUGH-SHORTNESS OF BREATH-FATIGUE    How long have you been experiencing symptoms: 3 TO 4 DAYS    Have you had these symptoms before:    [x] Yes  [] No    Have you been treated for these symptoms before:   [x] Yes  [] No    If a prescription is needed, what is your preferred pharmacy and phone number: [facility] #[id number] - [place], [place] - [address] AT [facility] [address] [facility] [facility] [phone] [facility] [phone]      Additional notes:PATIENT CALLED IN STATED HE WAS SEEN IN THE ER ON 01/25/23 AND WAS PROVIDED AN ANTIBIOTIC HE FURTHER STATED HE WAS DIAGNOSED WITH PNEUMONIA PATIENT IS REQUESTING A COUGH SYRUP TO ASSIST WITH COUGHING UP THE MUCAS

## 2023-01-25 NOTE — TELEPHONE ENCOUNTER
Patient has been evaluated in ER today, he has been dx with penumonia. He has ongoing chest pain and cough and requesting cough med prescribed

## 2023-01-26 NOTE — TELEPHONE ENCOUNTER
Lvm to call office back.    HUB CAN RELY MESSAGE     Sara Claros APRN [facility]  Clinical Pool 6 minutes ago (8:52 AM)     SK  Mucinex would be the best option to help him clear the congestion in his chest. The goal is not to suppress the cough, but make it effective. Mucinex is an expectorant and will help get the secretions out. He can get that over the counter.

## 2023-02-03 ENCOUNTER — OFFICE VISIT (OUTPATIENT)
Dept: FAMILY MEDICINE CLINIC | Facility: CLINIC | Age: 58
End: 2023-02-03
Payer: COMMERCIAL

## 2023-02-03 VITALS
BODY MASS INDEX: 22.04 KG/M2 | SYSTOLIC BLOOD PRESSURE: 130 MMHG | HEIGHT: 76 IN | WEIGHT: 181 LBS | OXYGEN SATURATION: 95 % | HEART RATE: 90 BPM | DIASTOLIC BLOOD PRESSURE: 78 MMHG | TEMPERATURE: 98.7 F

## 2023-02-03 DIAGNOSIS — J32.9 RECURRENT SINUSITIS: ICD-10-CM

## 2023-02-03 DIAGNOSIS — J44.1 COPD WITH EXACERBATION: ICD-10-CM

## 2023-02-03 DIAGNOSIS — M54.2 NECK PAIN: ICD-10-CM

## 2023-02-03 DIAGNOSIS — J43.2 CENTRILOBULAR EMPHYSEMA: ICD-10-CM

## 2023-02-03 DIAGNOSIS — R20.2 RIGHT HAND PARESTHESIA: Primary | ICD-10-CM

## 2023-02-03 PROCEDURE — 99214 OFFICE O/P EST MOD 30 MIN: CPT | Performed by: PHYSICIAN ASSISTANT

## 2023-02-03 RX ORDER — METHYLPREDNISOLONE 4 MG/1
TABLET ORAL
Qty: 21 EACH | Refills: 0 | Status: SHIPPED | OUTPATIENT
Start: 2023-02-03

## 2023-02-03 RX ORDER — AZITHROMYCIN 250 MG/1
TABLET, FILM COATED ORAL
Qty: 6 TABLET | Refills: 0 | Status: SHIPPED | OUTPATIENT
Start: 2023-02-03

## 2023-02-03 RX ORDER — BENZONATATE 200 MG/1
200 CAPSULE ORAL 3 TIMES DAILY PRN
Qty: 30 CAPSULE | Refills: 0 | Status: SHIPPED | OUTPATIENT
Start: 2023-02-03

## 2023-02-03 RX ORDER — DEXTROMETHORPHAN HYDROBROMIDE AND PROMETHAZINE HYDROCHLORIDE 15; 6.25 MG/5ML; MG/5ML
5 SYRUP ORAL 4 TIMES DAILY PRN
Qty: 240 ML | Refills: 0 | Status: SHIPPED | OUTPATIENT
Start: 2023-02-03

## 2023-02-03 RX ORDER — IPRATROPIUM BROMIDE AND ALBUTEROL SULFATE 2.5; .5 MG/3ML; MG/3ML
3 SOLUTION RESPIRATORY (INHALATION) EVERY 4 HOURS PRN
Qty: 360 ML | Refills: 1 | Status: SHIPPED | OUTPATIENT
Start: 2023-02-03

## 2023-02-03 NOTE — PROGRESS NOTES
Chief Complaint   Patient presents with   • Hospital Follow Up Visit     ED Visit 1/25/23 due to Paresthesia, Pneumonia Right Midlung. Pt. States he still feels Shortness Of Breath    • Cough     Pt. States he would like cough meds for sleeping at night.   • Med Refill     Pt. States he would like something to help with his cough         Stan Israel Jr. is a 57 y.o. male who presents for Hospital Follow Up Visit (ED Visit 1/25/23 due to Paresthesia, Pneumonia Right Midlung. Pt. States he still feels Shortness Of Breath ), Cough (Pt. States he would like cough meds for sleeping at night.), and Med Refill (Pt. States he would like something to help with his cough)     Patient was recently seen at [facility] ER on 01/25 for right hand paresthesia and cough.  The paresthesia in patient's right hand has been ongoing for several months.  He denies any pain waking him up at night.  He does have some pain occasionally in his bicep extending into his hand and neck pain.  He has never had an EMG/nerve conduction study.  He does have an appointment with orthopedic doctor, Dr. Rushing on Monday that was scheduled by the ER.    He had a chest x-ray showing right base and midlung acute infection and was treated with doxycycline 100 mg twice daily for 7 days.  A CT chest without contrast was completed in November 2022 showing emphysema and scarring in this region of the lungs.  He had no concerns for malignancy at that time.  He does smoke cigarettes reports smoking 1 pack a day for the last 30 years.  He was seen in the ER he has quit smoking.  He is wearing a nicoderm patch today.  He is aware of the risk of smoking while wearing the patch and is not doing that.  He has been prescribed inhalers in the past for emphysema but does not feel that they are very helpful.  He does not have a nebulizer machine.  He reports that he has ongoing productive cough and shortness of air.  He was not prescribed a steroid pack while  "at the ER.  He requests cough syrup to help with sleep.  He is established with pulmonology.    He reports recurrent sinusitis for the last year.  He has been treated with multiple antibiotics.  He thinks Augmentin made him sick.  He has not had a CT sinus or seen an ENT.          Past Medical History:   Diagnosis Date   • Anxiety    • Depression        Past Surgical History:   Procedure Laterality Date   • HERNIA REPAIR         Family History   Problem Relation Age of Onset   • Cancer Mother    • Cancer Father    • Cancer Sister        Social History     Socioeconomic History   • Marital status: Single   Tobacco Use   • Smoking status: Every Day     Packs/day: 1.00     Years: 30.00     Pack years: 30.00     Types: Cigars, Cigarettes     Start date: 1/1/1982     Passive exposure: Never   • Smokeless tobacco: Never   Vaping Use   • Vaping Use: Never used   Substance and Sexual Activity   • Alcohol use: Yes     Alcohol/week: 2.0 standard drinks     Types: 2 Cans of beer per week   • Drug use: Yes     Types: Marijuana   • Sexual activity: Defer       No Known Allergies    ROS    Review of Systems   Constitutional: Positive for fatigue. Negative for chills and fever.   HENT: Positive for congestion, rhinorrhea and sinus pressure. Negative for ear pain, mouth sores, postnasal drip and sore throat.    Respiratory: Positive for cough, shortness of breath and wheezing.    Cardiovascular: Negative for chest pain and leg swelling.   Neurological: Positive for numbness. Negative for dizziness, weakness and headache.       Vitals:    02/03/23 1232   BP: 130/78   BP Location: Left arm   Patient Position: Sitting   Cuff Size: Adult   Pulse: 90   Temp: 98.7 °F (37.1 °C)   TempSrc: Temporal   SpO2: 95%   Weight: 82.1 kg (181 lb)   Height: 194.3 cm (76.5\")   PainSc: 0-No pain     Body mass index is 21.75 kg/m².    Current Outpatient Medications on File Prior to Visit   Medication Sig Dispense Refill   • albuterol sulfate  (90 " Base) MCG/ACT inhaler Inhale 2 puffs Every 4 (Four) Hours As Needed for Wheezing. 18 g 1   • doxycycline (MONODOX) 100 MG capsule Take 1 capsule by mouth 2 (Two) Times a Day. 14 capsule 0   • fexofenadine (Allegra Allergy) 180 MG tablet Take 1 tablet by mouth Daily. 30 tablet 2   • fluticasone (FLONASE) 50 MCG/ACT nasal spray SHAKE GENTLY AND INHALE 2 SPRAYS IN EACH NOSTRIL DAILY AS DIRECTED BY PROVIDER 16 g 1   • rOPINIRole (REQUIP) 0.5 MG tablet TAKE 1 TABLET BY MOUTH EVERY NIGHT 1 HOUR BEFORE BEDTIME 30 tablet 2   • tiotropium bromide-olodaterol (Stiolto Respimat) 2.5-2.5 MCG/ACT aerosol solution inhaler Inhale 2 puffs Daily. 4 g 11     No current facility-administered medications on file prior to visit.       Results for orders placed or performed during the hospital encounter of 01/25/23   COVID-19 and FLU A/B PCR - Swab, Nasopharynx    Specimen: Nasopharynx; Swab   Result Value Ref Range    COVID19 Not Detected Not Detected - Ref. Range    Influenza A PCR Not Detected Not Detected    Influenza B PCR Not Detected Not Detected       PE    Physical Exam  Vitals reviewed.   Constitutional:       General: He is not in acute distress.     Appearance: He is well-developed and normal weight. He is ill-appearing. He is not diaphoretic.   HENT:      Head: Normocephalic and atraumatic.      Right Ear: Hearing, tympanic membrane, ear canal and external ear normal.      Left Ear: Hearing, tympanic membrane, ear canal and external ear normal.      Nose: Nose normal.      Right Sinus: No maxillary sinus tenderness or frontal sinus tenderness.      Left Sinus: No maxillary sinus tenderness or frontal sinus tenderness.      Mouth/Throat:      Pharynx: Uvula midline. No posterior oropharyngeal erythema.   Eyes:      Conjunctiva/sclera: Conjunctivae normal.   Cardiovascular:      Rate and Rhythm: Normal rate and regular rhythm.      Heart sounds: Normal heart sounds. No murmur heard.    No friction rub. No gallop.   Pulmonary:       Effort: Pulmonary effort is normal. No respiratory distress.      Breath sounds: Wheezing and rales present.   Musculoskeletal:         General: Normal range of motion.      Cervical back: Normal range of motion.   Skin:     General: Skin is warm.      Findings: No erythema.   Neurological:      Mental Status: He is alert and oriented to person, place, and time.   Psychiatric:         Behavior: Behavior normal.         Thought Content: Thought content normal.         Judgment: Judgment normal.          A/P    Diagnoses and all orders for this visit:    1. Right hand paresthesia (Primary)  -     EMG & Nerve Conduction Test; Future    2. Neck pain  -     EMG & Nerve Conduction Test; Future    3. Recurrent sinusitis  -     CT Sinus Without Contrast; Future    4. COPD with exacerbation (HCC)  -     azithromycin (Zithromax Z-Neal) 250 MG tablet; Take 2 tablets by mouth on day 1, then 1 tablet daily on days 2-5  Dispense: 6 tablet; Refill: 0  -     methylPREDNISolone (MEDROL) 4 MG dose pack; Take as directed on package instructions.  Dispense: 21 each; Refill: 0  -     promethazine-dextromethorphan (PROMETHAZINE-DM) 6.25-15 MG/5ML syrup; Take 5 mL by mouth 4 (Four) Times a Day As Needed for Cough.  Dispense: 240 mL; Refill: 0  -     benzonatate (TESSALON) 200 MG capsule; Take 1 capsule by mouth 3 (Three) Times a Day As Needed for Cough.  Dispense: 30 capsule; Refill: 0  -     Home Nebulizer  -     ipratropium-albuterol (DUO-NEB) 0.5-2.5 mg/3 ml nebulizer; Take 3 mL by nebulization Every 4 (Four) Hours As Needed for Wheezing or Shortness of Air.  Dispense: 360 mL; Refill: 1    5. Centrilobular emphysema (HCC)  -     Home Nebulizer  -     ipratropium-albuterol (DUO-NEB) 0.5-2.5 mg/3 ml nebulizer; Take 3 mL by nebulization Every 4 (Four) Hours As Needed for Wheezing or Shortness of Air.  Dispense: 360 mL; Refill: 1         Plan of care reviewed with patient at the conclusion of today's visit. Education was provided  regarding diagnosis, management and any prescribed or recommended OTC medications.  Patient verbalizes understanding of and agreement with management plan.    Dictated Utilizing Dragon Dictation     Please note that portions of this note were completed with a voice recognition program.     Part of this note may be an electronic transcription/translation of spoken language to printed text using the Dragon Dictation System.    Return in about 2 weeks (around 2/17/2023) for Recheck, COPD with Sara.     Ana Amado PA-C

## 2023-02-10 DIAGNOSIS — G25.81 RESTLESS LEG SYNDROME: ICD-10-CM

## 2023-02-10 DIAGNOSIS — T78.40XA ALLERGY, INITIAL ENCOUNTER: ICD-10-CM

## 2023-02-10 RX ORDER — TIOTROPIUM BROMIDE AND OLODATEROL 3.124; 2.736 UG/1; UG/1
2 SPRAY, METERED RESPIRATORY (INHALATION)
Qty: 4 G | Refills: 11 | OUTPATIENT
Start: 2023-02-10

## 2023-02-10 RX ORDER — FLUTICASONE PROPIONATE 50 MCG
2 SPRAY, SUSPENSION (ML) NASAL DAILY
Qty: 16 G | Refills: 2 | Status: SHIPPED | OUTPATIENT
Start: 2023-02-10

## 2023-02-10 RX ORDER — ROPINIROLE 0.5 MG/1
0.5 TABLET, FILM COATED ORAL NIGHTLY
Qty: 30 TABLET | Refills: 2 | OUTPATIENT
Start: 2023-02-10

## 2023-02-10 RX ORDER — FEXOFENADINE HCL 180 MG/1
180 TABLET ORAL DAILY
Qty: 30 TABLET | Refills: 2 | Status: SHIPPED | OUTPATIENT
Start: 2023-02-10

## 2023-02-10 RX ORDER — ALBUTEROL SULFATE 90 UG/1
2 AEROSOL, METERED RESPIRATORY (INHALATION) EVERY 4 HOURS PRN
Qty: 18 G | Refills: 1 | Status: SHIPPED | OUTPATIENT
Start: 2023-02-10

## 2023-02-10 NOTE — TELEPHONE ENCOUNTER
Caller: Stan Israel Jr.    Relationship: Self    Best call back number:[phone]    Requested Prescriptions:   Requested Prescriptions     Pending Prescriptions Disp Refills   • fexofenadine (Allegra Allergy) 180 MG tablet 30 tablet 2     Sig: Take 1 tablet by mouth Daily.   • rOPINIRole (REQUIP) 0.5 MG tablet 30 tablet 2     Sig: Take 1 tablet by mouth Every Night. Take 1 hour before bedtime   • albuterol sulfate  (90 Base) MCG/ACT inhaler 18 g 1     Sig: Inhale 2 puffs Every 4 (Four) Hours As Needed for Wheezing.   • fluticasone (FLONASE) 50 MCG/ACT nasal spray 16 g 1   • tiotropium bromide-olodaterol (Stiolto Respimat) 2.5-2.5 MCG/ACT aerosol solution inhaler 4 g 11     Sig: Inhale 2 puffs Daily.      Pharmacy where request should be sent: [facility] #[id number] [facility] [address] AT [facility] [address] . [facility] [phone] [facility] [phone]      Additional details provided by patient: 1 DAY OF MEDICATION ON HAND     Does the patient have less than a 3 day supply:  [x] Yes  [] No    Would you like a call back once the refill request has been completed: [x] Yes [] No    If the office needs to give you a call back, can they leave a voicemail: [x] Yes [] No    Milly Short   02/10/23 08:53 EST

## 2023-02-26 DIAGNOSIS — T78.40XA ALLERGY, INITIAL ENCOUNTER: ICD-10-CM

## 2023-02-27 RX ORDER — ALBUTEROL SULFATE 90 UG/1
AEROSOL, METERED RESPIRATORY (INHALATION)
Qty: 18 G | Refills: 1 | OUTPATIENT
Start: 2023-02-27

## 2023-02-27 NOTE — TELEPHONE ENCOUNTER
Rx Refill Note  Requested Prescriptions     Pending Prescriptions Disp Refills   • Ventolin  (90 Base) MCG/ACT inhaler [Pharmacy Med Name: VENTOLIN HFA INH W/DOS CTR 200PUFFS] 18 g 1     Sig: INHALE 2 PUFFS BY MOUTH EVERY 4 HOURS AS NEEDED FOR WHEEZING      Last office visit with prescribing clinician: 11/21/2022   Last telemedicine visit with prescribing clinician: Visit date not found   Next office visit with prescribing clinician: Visit date not found                         Would you like a call back once the refill request has been completed: [] Yes [] No    If the office needs to give you a call back, can they leave a voicemail: [] Yes [] No    Shelley Landaevrde MA  02/27/23, 11:15 EST

## 2023-04-11 ENCOUNTER — TELEPHONE (OUTPATIENT)
Dept: FAMILY MEDICINE CLINIC | Facility: CLINIC | Age: 58
End: 2023-04-11
Payer: COMMERCIAL

## 2023-04-11 NOTE — TELEPHONE ENCOUNTER
Caller: Stan Israel Jr.    Relationship: Self    Best call back number: [phone]    What medication are you requesting: Z PAC OR SOMETHING SIMILAR    What are your current symptoms: SINUS PRESSURE BEHIND RIGHT EYE AND SWOLLEN ROOF OF MOUTH     How long have you been experiencing symptoms: 2-3 DAYS    Have you had these symptoms before:    [x] Yes  [] No    Have you been treated for these symptoms before:  HAS BEEN TREATED WITH Z PAC IN THE PAST    [x] Yes  [] No    If a prescription is needed, what is your preferred pharmacy and phone number: [id number] - [place], [place] - [address] AT [facility] [address] & [address] [facility] [phone] [facility] [phone]      Additional notes: PATIENT STATES THAT HE HAS EXPERIENCED THESE SYMPTOMS IN THE PAST AND HAS BEEN TREATED WITH Z PAC'S SUCCESSFULLY.     HE STATES THAT HE IS UNABLE TO COME IN FOR AN APPOINTMENT FOR THE TIME BEING DUE TO TRANSPORTATION ISSUES AND BECAUSE OF THIS, HE HAD TO CANCEL THE CT SCAN OF HIS SINUS CAVITY THAT WAS SCHEDULED ON 4/5/23.

## 2023-04-14 NOTE — TELEPHONE ENCOUNTER
Patient was recently evaluated at [facility] and received abx. He will follow up with PCP as scheduled

## 2023-04-20 DIAGNOSIS — G25.81 RESTLESS LEG SYNDROME: ICD-10-CM

## 2023-04-20 RX ORDER — ROPINIROLE 0.5 MG/1
TABLET, FILM COATED ORAL
Qty: 30 TABLET | Refills: 2 | Status: SHIPPED | OUTPATIENT
Start: 2023-04-20

## 2023-04-20 NOTE — TELEPHONE ENCOUNTER
Rx Refill Note  Requested Prescriptions     Pending Prescriptions Disp Refills   • rOPINIRole (REQUIP) 0.5 MG tablet [Pharmacy Med Name: ROPINIROLE 0.5MG TABLETS] 30 tablet 2     Sig: TAKE 1 TABLET BY MOUTH EVERY NIGHT 1 HOUR BEFORE BEDTIME      Last office visit with prescribing clinician: 11/21/2022   Last telemedicine visit with prescribing clinician: 4/28/2023   Next office visit with prescribing clinician: 4/28/2023                         Would you like a call back once the refill request has been completed: [] Yes [] No    If the office needs to give you a call back, can they leave a voicemail: [] Yes [] No    Birdie Moe MA  04/20/23, 07:44 EDT

## 2023-09-21 ENCOUNTER — OFFICE VISIT (OUTPATIENT)
Age: 58
End: 2023-09-21
Payer: COMMERCIAL

## 2023-09-21 VITALS
BODY MASS INDEX: 22.11 KG/M2 | HEIGHT: 76 IN | HEART RATE: 64 BPM | WEIGHT: 181.6 LBS | DIASTOLIC BLOOD PRESSURE: 74 MMHG | OXYGEN SATURATION: 96 % | SYSTOLIC BLOOD PRESSURE: 122 MMHG

## 2023-09-21 DIAGNOSIS — N63.42 SUBAREOLAR MASS OF LEFT BREAST: ICD-10-CM

## 2023-09-21 DIAGNOSIS — M25.551 RIGHT HIP PAIN: ICD-10-CM

## 2023-09-21 DIAGNOSIS — G25.81 RESTLESS LEG SYNDROME: ICD-10-CM

## 2023-09-21 DIAGNOSIS — R11.2 NAUSEA AND VOMITING, UNSPECIFIED VOMITING TYPE: ICD-10-CM

## 2023-09-21 DIAGNOSIS — T78.40XA ALLERGY, INITIAL ENCOUNTER: ICD-10-CM

## 2023-09-21 DIAGNOSIS — K21.9 GASTROESOPHAGEAL REFLUX DISEASE WITHOUT ESOPHAGITIS: ICD-10-CM

## 2023-09-21 DIAGNOSIS — R51.9 FREQUENT HEADACHES: Primary | ICD-10-CM

## 2023-09-21 PROBLEM — J85.0: Status: ACTIVE | Noted: 2021-06-08

## 2023-09-21 PROBLEM — D63.8 ANEMIA IN OTHER CHRONIC DISEASES CLASSIFIED ELSEWHERE: Status: ACTIVE | Noted: 2021-06-08

## 2023-09-21 PROBLEM — D72.823 NEUTROPHILIC LEUKEMOID REACTION: Status: ACTIVE | Noted: 2021-06-08

## 2023-09-21 PROBLEM — F10.10 ALCOHOL ABUSE: Status: ACTIVE | Noted: 2021-06-08

## 2023-09-21 PROBLEM — F17.210 NICOTINE DEPENDENCE, CIGARETTES, UNCOMPLICATED: Status: ACTIVE | Noted: 2021-06-08

## 2023-09-21 RX ORDER — FEXOFENADINE HCL 180 MG/1
180 TABLET ORAL DAILY
Qty: 90 TABLET | Refills: 1 | Status: SHIPPED | OUTPATIENT
Start: 2023-09-21

## 2023-09-21 RX ORDER — ONDANSETRON 4 MG/1
4 TABLET, ORALLY DISINTEGRATING ORAL EVERY 8 HOURS PRN
Qty: 12 TABLET | Refills: 0 | Status: SHIPPED | OUTPATIENT
Start: 2023-09-21

## 2023-09-21 RX ORDER — OMEPRAZOLE 40 MG/1
40 CAPSULE, DELAYED RELEASE ORAL DAILY
Qty: 30 CAPSULE | Refills: 0 | Status: SHIPPED | OUTPATIENT
Start: 2023-09-21

## 2023-09-21 RX ORDER — ONDANSETRON 4 MG/1
4 TABLET, ORALLY DISINTEGRATING ORAL EVERY 8 HOURS PRN
COMMUNITY
Start: 2023-05-27 | End: 2023-09-21 | Stop reason: SDUPTHER

## 2023-09-21 RX ORDER — TIOTROPIUM BROMIDE AND OLODATEROL 3.124; 2.736 UG/1; UG/1
2 SPRAY, METERED RESPIRATORY (INHALATION)
Qty: 4 G | Refills: 2 | Status: SHIPPED | OUTPATIENT
Start: 2023-09-21

## 2023-09-21 RX ORDER — FAMOTIDINE 20 MG/1
20 TABLET, FILM COATED ORAL 2 TIMES DAILY
COMMUNITY
Start: 2023-05-27 | End: 2023-09-21

## 2023-09-21 RX ORDER — FLUTICASONE PROPIONATE 50 MCG
2 SPRAY, SUSPENSION (ML) NASAL DAILY
Qty: 16 G | Refills: 2 | Status: SHIPPED | OUTPATIENT
Start: 2023-09-21

## 2023-09-21 RX ORDER — ROPINIROLE 1 MG/1
1 TABLET, FILM COATED ORAL NIGHTLY
Qty: 90 TABLET | Refills: 0 | Status: SHIPPED | OUTPATIENT
Start: 2023-09-21
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
## 2023-09-22 ENCOUNTER — HOSPITAL ENCOUNTER (OUTPATIENT)
Dept: GENERAL RADIOLOGY | Facility: HOSPITAL | Age: 58
Discharge: HOME | End: 2023-09-22
Admitting: NURSE PRACTITIONER
Payer: COMMERCIAL

## 2023-09-22 DIAGNOSIS — M25.551 RIGHT HIP PAIN: ICD-10-CM

## 2023-09-22 PROCEDURE — 73502 X-RAY EXAM HIP UNI 2-3 VIEWS: CPT

## 2023-09-25 DIAGNOSIS — M16.0 OSTEOARTHRITIS OF BOTH HIPS, UNSPECIFIED OSTEOARTHRITIS TYPE: Primary | ICD-10-CM

## 2023-09-29 ENCOUNTER — TELEPHONE (OUTPATIENT)
Age: 58
End: 2023-09-29
Payer: COMMERCIAL

## 2023-09-29 ENCOUNTER — TELEPHONE (OUTPATIENT)
Age: 58
End: 2023-09-29

## 2023-09-29 ENCOUNTER — DOCUMENTATION (OUTPATIENT)
Age: 58
End: 2023-09-29
Payer: COMMERCIAL

## 2023-09-29 DIAGNOSIS — N63.0 BREAST MASS IN MALE: Primary | ICD-10-CM

## 2023-09-29 NOTE — TELEPHONE ENCOUNTER
Patient received a phone call to schedule mammogram but they requested an order for bilateral mammogram

## 2023-09-29 NOTE — TELEPHONE ENCOUNTER
Caller: Stan Israel Jr.    Relationship: Self    Best call back number: [phone]     What medication are you requesting: COUGH SYRUP    What are your current symptoms: A SORENESS IN THE CHEST AND A RASPY COUGH.    How long have you been experiencing symptoms: 09/27/23    Have you had these symptoms before:    [] Yes  [x] No    Have you been treated for these symptoms before:   [] Yes  [x] No    If a prescription is needed, what is your preferred pharmacy and phone number: [facility] [id number] [address] [phone] [facility] [phone]      Additional notes:PATIENT HAS A SORENESS IN THE CHEST AND A RASPY COUGH.    PATIENT IS REQUESTING A COUGH SYRUP TO BE SENT IN THE PHARMACY

## 2023-10-02 NOTE — TELEPHONE ENCOUNTER
Sara Pope APRN  [facility]3 hours ago (8:14 AM)     SC  If he still has a cough, I would prefer that he be seen. Can he schedule an appointment?   Called and left vm for patient to return call    OK FOR HUB TO RELAY MESSAGE AND SCHEDULE APPOINTMENT

## 2023-10-23 ENCOUNTER — LAB (OUTPATIENT)
Age: 58
End: 2023-10-23
Payer: COMMERCIAL

## 2023-10-23 ENCOUNTER — OFFICE VISIT (OUTPATIENT)
Age: 58
End: 2023-10-23
Payer: COMMERCIAL

## 2023-10-23 VITALS
DIASTOLIC BLOOD PRESSURE: 84 MMHG | WEIGHT: 180 LBS | SYSTOLIC BLOOD PRESSURE: 128 MMHG | OXYGEN SATURATION: 96 % | HEIGHT: 77 IN | BODY MASS INDEX: 21.25 KG/M2 | HEART RATE: 95 BPM | TEMPERATURE: 98.9 F

## 2023-10-23 DIAGNOSIS — G89.29 CHRONIC PAIN OF RIGHT HIP: Primary | ICD-10-CM

## 2023-10-23 DIAGNOSIS — Z79.1 NSAID LONG-TERM USE: ICD-10-CM

## 2023-10-23 DIAGNOSIS — M25.551 RIGHT HIP PAIN: Primary | ICD-10-CM

## 2023-10-23 DIAGNOSIS — Z79.1 ENCOUNTER FOR LONG-TERM (CURRENT) USE OF NON-STEROIDAL ANTI-INFLAMMATORIES: ICD-10-CM

## 2023-10-23 DIAGNOSIS — Z23 NEED FOR VACCINATION: ICD-10-CM

## 2023-10-23 DIAGNOSIS — Z23 FLU VACCINE NEED: ICD-10-CM

## 2023-10-23 DIAGNOSIS — M25.551 RIGHT HIP PAIN: ICD-10-CM

## 2023-10-23 DIAGNOSIS — G25.81 RESTLESS LEG SYNDROME: ICD-10-CM

## 2023-10-23 DIAGNOSIS — M25.551 CHRONIC PAIN OF RIGHT HIP: Primary | ICD-10-CM

## 2023-10-23 DIAGNOSIS — K21.9 GASTROESOPHAGEAL REFLUX DISEASE WITHOUT ESOPHAGITIS: ICD-10-CM

## 2023-10-23 LAB
DEPRECATED RDW RBC AUTO: 41.7 FL (ref 37–54)
ERYTHROCYTE [DISTWIDTH] IN BLOOD BY AUTOMATED COUNT: 13 % (ref 12.3–15.4)
HCT VFR BLD AUTO: 47.3 % (ref 37.5–51)
HGB BLD-MCNC: 16.2 G/DL (ref 13–17.7)
MCH RBC QN AUTO: 30.8 PG (ref 26.6–33)
MCHC RBC AUTO-ENTMCNC: 34.2 G/DL (ref 31.5–35.7)
MCV RBC AUTO: 89.9 FL (ref 79–97)
PLATELET # BLD AUTO: 273 10*3/MM3 (ref 140–450)
PMV BLD AUTO: 10.9 FL (ref 6–12)
RBC # BLD AUTO: 5.26 10*6/MM3 (ref 4.14–5.8)
WBC NRBC COR # BLD: 7.13 10*3/MM3 (ref 3.4–10.8)

## 2023-10-23 PROCEDURE — 85027 COMPLETE CBC AUTOMATED: CPT | Performed by: NURSE PRACTITIONER

## 2023-10-23 PROCEDURE — 36415 COLL VENOUS BLD VENIPUNCTURE: CPT | Performed by: NURSE PRACTITIONER

## 2023-10-23 PROCEDURE — 80053 COMPREHEN METABOLIC PANEL: CPT | Performed by: NURSE PRACTITIONER

## 2023-10-23 RX ORDER — OMEPRAZOLE 40 MG/1
40 CAPSULE, DELAYED RELEASE ORAL DAILY
Qty: 90 CAPSULE | Refills: 0 | Status: SHIPPED | OUTPATIENT
Start: 2023-10-23 | End: 2023-10-23

## 2023-10-23 RX ORDER — OMEPRAZOLE 40 MG/1
40 CAPSULE, DELAYED RELEASE ORAL DAILY
Qty: 90 CAPSULE | Refills: 0 | Status: SHIPPED | OUTPATIENT
Start: 2023-10-23

## 2023-10-23 RX ORDER — ROPINIROLE 2 MG/1
2 TABLET, FILM COATED ORAL NIGHTLY
Qty: 90 TABLET | Refills: 0 | Status: SHIPPED | OUTPATIENT
Start: 2023-10-23

## 2023-10-23 RX ORDER — ROPINIROLE 2 MG/1
2 TABLET, FILM COATED ORAL NIGHTLY
Qty: 90 TABLET | Refills: 0 | Status: SHIPPED | OUTPATIENT
Start: 2023-10-23 | End: 2023-10-23

## 2023-10-23 NOTE — LETTER
[facility]  Vaccine Consent Form    Patient Name:  Stan Israel Jr.  Patient :  1965     Vaccine(s) Ordered    Fluzone (or Fluarix & Flulaval for VFC) >6mos  Pneumococcal Conjugate Vaccine 20-Valent (PCV20)        Screening Checklist  The following questions should be completed prior to vaccination. If you answer “yes” to any question, it does not necessarily mean you should not be vaccinated. It just means we may need to clarify or ask more questions. If a question is unclear, please ask your healthcare provider to explain it.    Yes No   Any fever or moderate to severe illness today (mild illness and/or antibiotic treatment are not contraindications)?     Do you have a history of a serious reaction to any previous vaccinations, such as anaphylaxis, encephalopathy within 7 days, Guillain-Homewood syndrome within 6 weeks, seizure?     Have you received any live vaccine(s) in the past month (MMR, PHU)?     Do you have an anaphylactic allergy to latex (DTaP, DTaP-IPV, Hep A, Hep B, MenB, RV, Td, Tdap), baker’s yeast (Hep B, HPV), or gelatin (PHU, MMR)?     Do you have an anaphylactic allergy to neomycin (Rabies, PHU, MMR, IPV, Hep A), polymyxin B (IPV), or streptomycin (IPV)?      Any cancer, leukemia, AIDS, or other immune system disorder? (HPU, MMR, RV)     Do you have a parent, brother, or sister with an immune system problem (if immune competence of vaccine recipient clinically verified, can proceed)? (MMR, PHU)     Any recent steroid treatments for >2 weeks, chemotherapy, or radiation treatment? (PHU, MMR)     Have you received antibody-containing blood transfusions or IVIG in the past 11 months (recommended interval is dependent on product)? (MMR, PHU)     Have you taken antiviral drugs (acyclovir, famciclovir, valacyclovir) in the last 24 or 48 hours, respectively (PHU)?      Are you pregnant or planning to become pregnant within 1 month? (PHU, MMR, HPV, IPV, MenB; For hep B- refer to Engerix-B)      For infants, have you ever been told your child has had intussusception or a medical emergency involving obstruction of the intestine (RV)? If not for an infant, can skip this question.         *Ordering Physician/APC should be consulted if “yes” is checked by the patient or guardian above.      I have received, read, and understand the Vaccine Information Statement (VIS) for each vaccine ordered above.  I have considered my health status as well as the health status of my close contacts.  I have taken the opportunity to discuss my vaccine questions with my health care provider.   I have requested that the ordered vaccine(s) be given to me.  I understand the benefits and risks of the vaccines.  I understand that I should remain in the clinic for 15 minutes after receiving the vaccine(s).  _________________________________________________________  Signature of Patient or Parent/Legal Guardian ____________________  Date

## 2023-10-23 NOTE — PROGRESS NOTES
"Chief Complaint  Annual Exam (R hip pain,)    Subjective        Stan Israel Jr. presents to [facility]  History of Present Illness  Pt is here today to follow up with right hip pain. He is taking ibuprofen for pain management, states it is not helping. Referral was placed to Rheumatology; pt states he has not gotten a phone call from them. Provided pt with number to  Rheumatology and he states he will call. Referring to PT for treatment.     He has been taking ropinirole for restless leg. He states pain has improved, but feels that the dose needs to be increased. Will increase dose to 2 Mg nightly.    Objective   Vital Signs:  /84   Pulse 95   Temp 98.9 °F (37.2 °C)   Ht 194.3 cm (76.5\")   Wt 81.6 kg (180 lb)   SpO2 96%   BMI 21.62 kg/m²   Estimated body mass index is 21.62 kg/m² as calculated from the following:    Height as of this encounter: 194.3 cm (76.5\").    Weight as of this encounter: 81.6 kg (180 lb).       BMI is within normal parameters. No other follow-up for BMI required.    Physical Exam  Vitals reviewed.   Constitutional:       Appearance: Normal appearance.   HENT:      Nose: Nose normal.      Mouth/Throat:      Pharynx: Oropharynx is clear.   Eyes:      Conjunctiva/sclera: Conjunctivae normal.   Cardiovascular:      Rate and Rhythm: Normal rate and regular rhythm.      Heart sounds: Normal heart sounds.   Pulmonary:      Effort: Pulmonary effort is normal.      Breath sounds: Normal breath sounds.   Musculoskeletal:         General: Normal range of motion.      Cervical back: Normal range of motion.   Skin:     General: Skin is warm.   Neurological:      Mental Status: He is alert and oriented to person, place, and time.   Psychiatric:         Mood and Affect: Mood normal.         Behavior: Behavior normal.         Thought Content: Thought content normal.        Result Review :                 Assessment and Plan   Diagnoses and all orders for " this visit:    1. Chronic pain of right hip (Primary)  -     Ambulatory Referral to Physical Therapy Evaluate and treat  -     Comprehensive Metabolic Panel; Future    2. NSAID long-term use  -     CBC (No Diff); Future  -     Comprehensive Metabolic Panel; Future    3. Gastroesophageal reflux disease without esophagitis  -     Discontinue: omeprazole (priLOSEC) 40 MG capsule; Take 1 capsule by mouth Daily.  Dispense: 90 capsule; Refill: 0  -     omeprazole (priLOSEC) 40 MG capsule; Take 1 capsule by mouth Daily.  Dispense: 90 capsule; Refill: 0    4. Restless leg syndrome  -     Discontinue: rOPINIRole (REQUIP) 2 MG tablet; Take 1 tablet by mouth Every Night. Take 1 hour before bedtime  Dispense: 90 tablet; Refill: 0  -     rOPINIRole (REQUIP) 2 MG tablet; Take 1 tablet by mouth Every Night. Take 1 hour before bedtime  Dispense: 90 tablet; Refill: 0    5. Flu vaccine need  -     Fluzone (or Fluarix & Flulaval for VFC) >6mos    6. Need for vaccination  -     Pneumococcal Conjugate Vaccine 20-Valent (PCV20)             Follow Up   Return in about 1 month (around 11/23/2023) for Annual; must be after 10/25/23.  Patient was given instructions and counseling regarding his condition or for health maintenance advice. Please see specific information pulled into the AVS if appropriate.

## 2023-10-24 ENCOUNTER — TELEPHONE (OUTPATIENT)
Age: 58
End: 2023-10-24
Payer: COMMERCIAL

## 2023-10-24 DIAGNOSIS — M25.551 CHRONIC PAIN OF RIGHT HIP: ICD-10-CM

## 2023-10-24 DIAGNOSIS — M25.551 CHRONIC PAIN OF RIGHT HIP: Primary | ICD-10-CM

## 2023-10-24 DIAGNOSIS — G89.29 CHRONIC PAIN OF RIGHT HIP: ICD-10-CM

## 2023-10-24 DIAGNOSIS — G89.29 CHRONIC PAIN OF RIGHT HIP: Primary | ICD-10-CM

## 2023-10-24 LAB
ALBUMIN SERPL-MCNC: 4.2 G/DL (ref 3.5–5.2)
ALBUMIN/GLOB SERPL: 1.2 G/DL
ALP SERPL-CCNC: 107 U/L (ref 39–117)
ALT SERPL W P-5'-P-CCNC: 27 U/L (ref 1–41)
ANION GAP SERPL CALCULATED.3IONS-SCNC: 10 MMOL/L (ref 5–15)
AST SERPL-CCNC: 34 U/L (ref 1–40)
BILIRUB SERPL-MCNC: 0.5 MG/DL (ref 0–1.2)
BUN SERPL-MCNC: 9 MG/DL (ref 6–20)
BUN/CREAT SERPL: 10.6 (ref 7–25)
CALCIUM SPEC-SCNC: 10.1 MG/DL (ref 8.6–10.5)
CHLORIDE SERPL-SCNC: 100 MMOL/L (ref 98–107)
CO2 SERPL-SCNC: 27 MMOL/L (ref 22–29)
CREAT SERPL-MCNC: 0.85 MG/DL (ref 0.76–1.27)
EGFRCR SERPLBLD CKD-EPI 2021: 101.4 ML/MIN/1.73
GLOBULIN UR ELPH-MCNC: 3.4 GM/DL
GLUCOSE SERPL-MCNC: 108 MG/DL (ref 65–99)
POTASSIUM SERPL-SCNC: 4.4 MMOL/L (ref 3.5–5.2)
PROT SERPL-MCNC: 7.6 G/DL (ref 6–8.5)
SODIUM SERPL-SCNC: 137 MMOL/L (ref 136–145)

## 2023-10-24 RX ORDER — MELOXICAM 7.5 MG/1
7.5-15 TABLET ORAL DAILY PRN
Qty: 60 TABLET | Refills: 1 | Status: SHIPPED | OUTPATIENT
Start: 2023-10-24

## 2023-10-24 RX ORDER — MELOXICAM 7.5 MG/1
7.5-15 TABLET ORAL DAILY PRN
Qty: 60 TABLET | Refills: 1 | Status: SHIPPED | OUTPATIENT
Start: 2023-10-24 | End: 2023-10-24 | Stop reason: SDUPTHER

## 2023-11-16 ENCOUNTER — HOSPITAL ENCOUNTER (OUTPATIENT)
Dept: MAMMOGRAPHY | Facility: HOSPITAL | Age: 58
Discharge: HOME | End: 2023-11-16
Admitting: NURSE PRACTITIONER
Payer: COMMERCIAL

## 2023-11-16 ENCOUNTER — HOSPITAL ENCOUNTER (OUTPATIENT)
Dept: ULTRASOUND IMAGING | Facility: HOSPITAL | Age: 58
Discharge: HOME | End: 2023-11-16
Payer: COMMERCIAL

## 2023-11-16 DIAGNOSIS — N63.0 BREAST MASS IN MALE: ICD-10-CM

## 2023-11-16 PROCEDURE — 77066 DX MAMMO INCL CAD BI: CPT

## 2023-11-16 PROCEDURE — 76642 ULTRASOUND BREAST LIMITED: CPT

## 2023-11-16 PROCEDURE — G0279 TOMOSYNTHESIS, MAMMO: HCPCS

## 2023-11-16 PROCEDURE — 76642 ULTRASOUND BREAST LIMITED: CPT | Performed by: RADIOLOGY

## 2023-11-16 PROCEDURE — 77066 DX MAMMO INCL CAD BI: CPT | Performed by: RADIOLOGY

## 2023-11-16 PROCEDURE — 77062 BREAST TOMOSYNTHESIS BI: CPT | Performed by: RADIOLOGY

## 2023-11-26 DIAGNOSIS — G25.81 RESTLESS LEG SYNDROME: ICD-10-CM

## 2023-11-26 DIAGNOSIS — R11.2 NAUSEA AND VOMITING, UNSPECIFIED VOMITING TYPE: ICD-10-CM

## 2023-11-26 DIAGNOSIS — K21.9 GASTROESOPHAGEAL REFLUX DISEASE WITHOUT ESOPHAGITIS: ICD-10-CM

## 2023-11-27 ENCOUNTER — OFFICE VISIT (OUTPATIENT)
Age: 58
End: 2023-11-27
Payer: COMMERCIAL

## 2023-11-27 VITALS
TEMPERATURE: 97.8 F | OXYGEN SATURATION: 96 % | HEART RATE: 79 BPM | DIASTOLIC BLOOD PRESSURE: 82 MMHG | WEIGHT: 184.4 LBS | SYSTOLIC BLOOD PRESSURE: 130 MMHG | BODY MASS INDEX: 22.15 KG/M2

## 2023-11-27 DIAGNOSIS — J34.89 SINUS PRESSURE: Primary | ICD-10-CM

## 2023-11-27 DIAGNOSIS — J01.00 ACUTE NON-RECURRENT MAXILLARY SINUSITIS: ICD-10-CM

## 2023-11-27 RX ORDER — OMEPRAZOLE 40 MG/1
40 CAPSULE, DELAYED RELEASE ORAL DAILY
Qty: 90 CAPSULE | Refills: 0 | OUTPATIENT
Start: 2023-11-27

## 2023-11-27 RX ORDER — AMOXICILLIN 875 MG/1
875 TABLET, COATED ORAL 2 TIMES DAILY
Qty: 20 TABLET | Refills: 0 | Status: SHIPPED | OUTPATIENT
Start: 2023-11-27 | End: 2023-12-07

## 2023-11-27 RX ORDER — ROPINIROLE 1 MG/1
TABLET, FILM COATED ORAL
Qty: 90 TABLET | Refills: 0 | OUTPATIENT
Start: 2023-11-27

## 2023-11-27 RX ORDER — ONDANSETRON 4 MG/1
TABLET, ORALLY DISINTEGRATING ORAL
Qty: 12 TABLET | Refills: 0 | OUTPATIENT
Start: 2023-11-27

## 2023-11-27 NOTE — PROGRESS NOTES
"Chief Complaint  Sinus Problem (Few days, thick mucus) and Facial Pain    Subjective        Stan Israel Jr. presents to [facility]  History of Present Illness  Pt is here today with sinus concerns. States symptoms started about a week and a half ago. He is experiencing facial pressure and thick, chunky nasal drainage.     Objective   Vital Signs:  /82   Pulse 79   Temp 97.8 °F (36.6 °C)   Wt 83.6 kg (184 lb 6.4 oz)   SpO2 96%   BMI 22.15 kg/m²   Estimated body mass index is 22.15 kg/m² as calculated from the following:    Height as of 10/23/23: 194.3 cm (76.5\").    Weight as of this encounter: 83.6 kg (184 lb 6.4 oz).       BMI is within normal parameters. No other follow-up for BMI required.    Physical Exam   Result Review :                 Assessment and Plan   Diagnoses and all orders for this visit:    1. Sinus pressure (Primary)  -     POCT SARS-CoV-2 + Flu Antigen ISIDRO    2. Acute non-recurrent maxillary sinusitis  -     amoxicillin (AMOXIL) 875 MG tablet; Take 1 tablet by mouth 2 (Two) Times a Day for 10 days.  Dispense: 20 tablet; Refill: 0             Follow Up   Return in about 1 month (around 12/27/2023) for Annual.  Patient was given instructions and counseling regarding his condition or for health maintenance advice. Please see specific information pulled into the AVS if appropriate.         "

## 2023-11-27 NOTE — PROGRESS NOTES
"Chief Complaint  Sinus Problem (Few days, thick mucus) and Facial Pain    Subjective        Stan Israel Jr. presents to [facility]  History of Present Illness  Pt is here today with sinus concerns. He states he has thick chunks of sinus drainage. He has had sinus symptoms about a week and a half and reports it is progressively getting worse. Covid and flu are negative today.     He is doing PT and states it is helping him tremendously. He is sleeping better at night and his pain has improved. PT feels that his pain is more likely due to Bursitis instead of arthritis.   Sinus Problem    Facial Pain        Objective   Vital Signs:  /82   Pulse 79   Temp 97.8 °F (36.6 °C)   Wt 83.6 kg (184 lb 6.4 oz)   SpO2 96%   BMI 22.15 kg/m²   Estimated body mass index is 22.15 kg/m² as calculated from the following:    Height as of 10/23/23: 194.3 cm (76.5\").    Weight as of this encounter: 83.6 kg (184 lb 6.4 oz).       BMI is within normal parameters. No other follow-up for BMI required.    Physical Exam   Result Review :                 Assessment and Plan   There are no diagnoses linked to this encounter.         Follow Up   No follow-ups on file.  Patient was given instructions and counseling regarding his condition or for health maintenance advice. Please see specific information pulled into the AVS if appropriate.         "

## 2023-11-27 NOTE — TELEPHONE ENCOUNTER
Rx Refill Note  Requested Prescriptions     Pending Prescriptions Disp Refills    ondansetron ODT (ZOFRAN-ODT) 4 MG disintegrating tablet [Pharmacy Med Name: ONDANSETRON ODT 4 MG TABLET] 12 tablet 0     Sig: PLACE 1 TABLET ON THE TONGUE EVERY 8 HOURS AS NEEDED FOR NAUSEA    rOPINIRole (REQUIP) 1 MG tablet [Pharmacy Med Name: rOPINIRole HCL 1 MG TABLET] 90 tablet 0     Sig: TAKE 1 TABLET BY MOUTH EVERY EVENING. TAKE 1 HOUR BEFORE BEDTIME    omeprazole (priLOSEC) 40 MG capsule [Pharmacy Med Name: OMEPRAZOLE DR 40 MG CAPSULE] 90 capsule 0     Sig: TAKE 1 CAPSULE BY MOUTH DAILY      Last office visit with prescribing clinician: 10/23/2023   Next office visit with prescribing clinician: 11/27/2023     Marilyn Yee MA  11/27/23, 10:23 EST    Last fill: 10/23/2023

## 2023-12-06 DIAGNOSIS — G25.81 RESTLESS LEG SYNDROME: ICD-10-CM

## 2023-12-06 RX ORDER — ROPINIROLE 2 MG/1
2 TABLET, FILM COATED ORAL NIGHTLY
Qty: 90 TABLET | Refills: 0 | Status: CANCELLED | OUTPATIENT
Start: 2023-12-06

## 2023-12-06 NOTE — TELEPHONE ENCOUNTER
Caller: Stan Israel Jr.    Relationship: Self    Best call back number: [phone]     Requested Prescriptions:   Requested Prescriptions     Pending Prescriptions Disp Refills    rOPINIRole (REQUIP) 2 MG tablet 90 tablet 0     Sig: Take 1 tablet by mouth Every Night. Take 1 hour before bedtime        Pharmacy where request should be sent: [facility] [id number] [address] [phone] [place] [phone]      Last office visit with prescribing clinician: 11/27/2023   Last telemedicine visit with prescribing clinician: Visit date not found   Next office visit with prescribing clinician: 12/28/2023     Additional details provided by patient: HE HAS BEEN OUT FOR A COUPLE OF DAYS    Does the patient have less than a 3 day supply:  [x] Yes  [] No    Would you like a call back once the refill request has been completed: [] Yes [x] No    If the office needs to give you a call back, can they leave a voicemail: [] Yes [x] No    Amauri Daniels, PCT   12/06/23 12:45 EST

## 2023-12-06 NOTE — TELEPHONE ENCOUNTER
Pt notified that a 90 day supply was sent to [facility] on 10/23. Pt states he hasn't picked that one up yet but will reach out to [facility] to request it be filled. Pt advised to have pharmacy call the office if they have additional questions.

## 2023-12-16 DIAGNOSIS — G25.81 RESTLESS LEG SYNDROME: ICD-10-CM

## 2023-12-18 RX ORDER — ROPINIROLE 1 MG/1
TABLET, FILM COATED ORAL
Qty: 90 TABLET | Refills: 0 | OUTPATIENT
Start: 2023-12-18

## 2023-12-18 NOTE — TELEPHONE ENCOUNTER
Rx Refill Note  Requested Prescriptions     Pending Prescriptions Disp Refills    rOPINIRole (REQUIP) 1 MG tablet [Pharmacy Med Name: rOPINIRole HCL 1 MG TABLET] 90 tablet 0     Sig: TAKE 1 TABLET BY MOUTH EVERY EVENING. TAKE 1 HOUR BEFORE BEDTIME      The original prescription was discontinued on 10/23/2023 by Sara Pope APRN for the following reason: Reorder. Renewing this prescription may not be appropriate.     Last office visit with prescribing clinician: 11/27/2023   Last telemedicine visit with prescribing clinician: Visit date not found   Next office visit with prescribing clinician: 12/28/2023     Milly Sullivan Rep  12/18/23, 08:10 EST

## 2023-12-24 DIAGNOSIS — M25.551 CHRONIC PAIN OF RIGHT HIP: ICD-10-CM

## 2023-12-24 DIAGNOSIS — G89.29 CHRONIC PAIN OF RIGHT HIP: ICD-10-CM

## 2023-12-26 RX ORDER — MELOXICAM 7.5 MG/1
TABLET ORAL
Qty: 60 TABLET | Refills: 1 | Status: SHIPPED | OUTPATIENT
Start: 2023-12-26

## 2023-12-26 NOTE — TELEPHONE ENCOUNTER
Rx Refill Note  Requested Prescriptions     Pending Prescriptions Disp Refills    meloxicam (MOBIC) 7.5 MG tablet [Pharmacy Med Name: MELOXICAM 7.5 MG TABLET] 60 tablet 1     Sig: TAKE 1 TO 2 TABLET BY MOUTH DAILY AS NEEDED FOR MODERATE PAIN      Last office visit with prescribing clinician: 11/27/2023   Last telemedicine visit with prescribing clinician: Visit date not found   Next office visit with prescribing clinician: 12/28/2023                         Would you like a call back once the refill request has been completed: [] Yes [] No    If the office needs to give you a call back, can they leave a voicemail: [] Yes [] No    Shelley Landaverde MA  12/26/23, 07:50 EST

## 2023-12-28 ENCOUNTER — LAB (OUTPATIENT)
Age: 58
End: 2023-12-28
Payer: COMMERCIAL

## 2023-12-28 ENCOUNTER — OFFICE VISIT (OUTPATIENT)
Age: 58
End: 2023-12-28
Payer: COMMERCIAL

## 2023-12-28 VITALS
HEART RATE: 75 BPM | OXYGEN SATURATION: 95 % | DIASTOLIC BLOOD PRESSURE: 70 MMHG | TEMPERATURE: 96.8 F | BODY MASS INDEX: 20.66 KG/M2 | HEIGHT: 77 IN | WEIGHT: 175 LBS | SYSTOLIC BLOOD PRESSURE: 126 MMHG

## 2023-12-28 DIAGNOSIS — R23.1 CLAMMINESS: ICD-10-CM

## 2023-12-28 DIAGNOSIS — Z13.1 SCREENING FOR DIABETES MELLITUS: Primary | ICD-10-CM

## 2023-12-28 DIAGNOSIS — Z00.00 ROUTINE GENERAL MEDICAL EXAMINATION AT A HEALTH CARE FACILITY: ICD-10-CM

## 2023-12-28 DIAGNOSIS — Z00.00 ANNUAL PHYSICAL EXAM: Primary | ICD-10-CM

## 2023-12-28 DIAGNOSIS — R11.2 NAUSEA AND VOMITING, UNSPECIFIED VOMITING TYPE: ICD-10-CM

## 2023-12-28 DIAGNOSIS — Z13.1 SCREENING FOR DIABETES MELLITUS: ICD-10-CM

## 2023-12-28 DIAGNOSIS — T78.40XA ALLERGY, INITIAL ENCOUNTER: ICD-10-CM

## 2023-12-28 DIAGNOSIS — Z00.00 ANNUAL PHYSICAL EXAM: ICD-10-CM

## 2023-12-28 DIAGNOSIS — R50.9 FEELS FEVERISH: ICD-10-CM

## 2023-12-28 DIAGNOSIS — K21.9 GASTROESOPHAGEAL REFLUX DISEASE WITHOUT ESOPHAGITIS: ICD-10-CM

## 2023-12-28 DIAGNOSIS — G25.81 RESTLESS LEG SYNDROME: ICD-10-CM

## 2023-12-28 LAB
CHOLEST SERPL-MCNC: 238 MG/DL (ref 0–200)
EXPIRATION DATE: NORMAL
FLUAV AG UPPER RESP QL IA.RAPID: NOT DETECTED
FLUBV AG UPPER RESP QL IA.RAPID: NOT DETECTED
GLUCOSE BLDC GLUCOMTR-MCNC: 101 MG/DL (ref 70–130)
HBA1C MFR BLD: 5.4 % (ref 4.8–5.6)
HDLC SERPL-MCNC: 94 MG/DL (ref 40–60)
INTERNAL CONTROL: NORMAL
LDLC SERPL CALC-MCNC: 131 MG/DL (ref 0–100)
LDLC/HDLC SERPL: 1.37 {RATIO}
Lab: NORMAL
SARS-COV-2 AG UPPER RESP QL IA.RAPID: NOT DETECTED
TRIGL SERPL-MCNC: 77 MG/DL (ref 0–150)
VLDLC SERPL-MCNC: 13 MG/DL (ref 5–40)

## 2023-12-28 PROCEDURE — 83036 HEMOGLOBIN GLYCOSYLATED A1C: CPT | Performed by: NURSE PRACTITIONER

## 2023-12-28 PROCEDURE — 80061 LIPID PANEL: CPT | Performed by: NURSE PRACTITIONER

## 2023-12-28 RX ORDER — OMEPRAZOLE 40 MG/1
40 CAPSULE, DELAYED RELEASE ORAL DAILY
Qty: 90 CAPSULE | Refills: 0 | Status: SHIPPED | OUTPATIENT
Start: 2023-12-28

## 2023-12-28 RX ORDER — ALBUTEROL SULFATE 90 UG/1
2 AEROSOL, METERED RESPIRATORY (INHALATION) EVERY 4 HOURS PRN
Qty: 18 G | Refills: 1 | Status: SHIPPED | OUTPATIENT
Start: 2023-12-28

## 2023-12-28 RX ORDER — FLUTICASONE PROPIONATE 50 MCG
2 SPRAY, SUSPENSION (ML) NASAL DAILY
Qty: 16 G | Refills: 2 | Status: SHIPPED | OUTPATIENT
Start: 2023-12-28

## 2023-12-28 RX ORDER — ROPINIROLE 3 MG/1
3 TABLET, FILM COATED ORAL NIGHTLY
Qty: 30 TABLET | Refills: 0 | Status: SHIPPED | OUTPATIENT
Start: 2023-12-28

## 2023-12-28 RX ORDER — ONDANSETRON 4 MG/1
4 TABLET, ORALLY DISINTEGRATING ORAL EVERY 8 HOURS PRN
Qty: 12 TABLET | Refills: 0 | Status: SHIPPED | OUTPATIENT
Start: 2023-12-28

## 2023-12-28 NOTE — PROGRESS NOTES
"Chief Complaint  Annual Exam, Med Refill, and Insomnia (Past few weeks)    Subjective          Stan Israel Jr. presents to [facility] for   History of Present Illness  Pt is here today for annual physical. He is up to date on immunizations. He states for the last week his RLS has been worse. Will increase ropinirole to 3 Mg. He is also having issues falling asleep. He is not getting a restful sleep at night. He is not drinking caffeine regularly. He has had some anxiety.     Pt reports feeling hot during visit, and is broke out in a cold sweat. Covid, flu, and blood glucose all normal in office today. Advised pt to monitor for other symptoms.     Objective   Vital Signs:   Vitals:    12/28/23 0810   BP: 126/70   Pulse: 75   Temp: 96.8 °F (36 °C)   SpO2: 95%   Weight: 79.4 kg (175 lb)   Height: 194.3 cm (76.5\")     Body mass index is 21.02 kg/m².    BMI is within normal parameters. No other follow-up for BMI required.    Physical Exam  Vitals reviewed.   Constitutional:       Appearance: Normal appearance.   HENT:      Right Ear: Tympanic membrane, ear canal and external ear normal.      Left Ear: Tympanic membrane, ear canal and external ear normal.      Nose: Nose normal.      Mouth/Throat:      Mouth: Mucous membranes are moist.      Pharynx: Oropharynx is clear.   Eyes:      Extraocular Movements: Extraocular movements intact.      Conjunctiva/sclera: Conjunctivae normal.      Pupils: Pupils are equal, round, and reactive to light.   Cardiovascular:      Rate and Rhythm: Normal rate. Rhythm irregular.      Heart sounds: Normal heart sounds.   Pulmonary:      Effort: Pulmonary effort is normal.      Breath sounds: Normal breath sounds.   Abdominal:      General: Bowel sounds are normal.      Palpations: Abdomen is soft.      Tenderness: There is no abdominal tenderness. There is no guarding or rebound.   Musculoskeletal:         General: Normal range of motion.      Cervical " back: Normal range of motion and neck supple.   Skin:     General: Skin is warm.   Neurological:      Mental Status: He is alert and oriented to person, place, and time.   Psychiatric:         Mood and Affect: Mood normal.         Behavior: Behavior normal.         Thought Content: Thought content normal.        Result Review :                Immunization History   Administered Date(s) Administered    COVID-19 (PFIZER) Purple Cap Monovalent 04/15/2021    Flu Vaccine Split Quad 10/03/2019    Fluzone (or Fluarix & Flulaval for VFC) >6mos 11/18/2014, 10/23/2023    Influenza Injectable Mdck Pf Quad 11/18/2014, 11/29/2020    Influenza, Unspecified 10/03/2019, 11/05/2022    Pneumococcal Conjugate 20-Valent (PCV20) 10/23/2023    Pneumococcal Polysaccharide (PPSV23) 11/18/2014    Shingrix 09/11/2018, 12/11/2018    Tdap 06/09/2018, 07/04/2018     Health Maintenance   Topic Date Due    HEPATITIS C SCREENING  Never done    COVID-19 Vaccine (2 - 2023-24 season) 09/01/2023    ANNUAL PHYSICAL  10/24/2023    TDAP/TD VACCINES (3 - Td or Tdap) 07/04/2028    COLORECTAL CANCER SCREENING  01/16/2033    Pneumococcal Vaccine 0-64  Completed    INFLUENZA VACCINE  Completed    ZOSTER VACCINE  Completed     Results for orders placed or performed in visit on 12/28/23   POCT SARS-CoV-2 + Flu Antigen ISIDRO    Specimen: Swab   Result Value Ref Range    SARS Antigen Not Detected Not Detected, Presumptive Negative    Influenza A Antigen ISIDRO Not Detected Not Detected    Influenza B Antigen ISIDRO Not Detected Not Detected    Internal Control Passed Passed    Lot Number [id number]     Expiration Date 10/27/24    POC Glucose    Specimen: Blood   Result Value Ref Range    Glucose 101 70 - 130 mg/dL          Assessment and Plan    Diagnoses and all orders for this visit:    1. Annual physical exam (Primary)  -     Lipid Panel; Future    2. Allergy, initial encounter  -     albuterol sulfate  (90 Base) MCG/ACT inhaler; Inhale 2 puffs Every 4 (Four)  Hours As Needed for Wheezing.  Dispense: 18 g; Refill: 1  -     fluticasone (FLONASE) 50 MCG/ACT nasal spray; 2 sprays into the nostril(s) as directed by provider Daily.  Dispense: 16 g; Refill: 2    3. Gastroesophageal reflux disease without esophagitis  -     omeprazole (priLOSEC) 40 MG capsule; Take 1 capsule by mouth Daily.  Dispense: 90 capsule; Refill: 0    4. Nausea and vomiting, unspecified vomiting type  -     ondansetron ODT (ZOFRAN-ODT) 4 MG disintegrating tablet; Place 1 tablet on the tongue Every 8 (Eight) Hours As Needed for Nausea.  Dispense: 12 tablet; Refill: 0    5. Screening for diabetes mellitus  -     Hemoglobin A1c; Future    6. Restless leg syndrome  -     rOPINIRole (REQUIP) 3 MG tablet; Take 1 tablet by mouth Every Night. Take 1 hour before bedtime  Dispense: 30 tablet; Refill: 0    7. Feels feverish  -     POCT SARS-CoV-2 + Flu Antigen ISIDRO    8. Clamminess  -     POC Glucose        Counseling/anticipatory guidance: Nutrition, physical activity, healthy weight, dental health, mental health, eye exam, immunizations, screenings    Discussed immunizations; pt up to date. He will go to local pharmacy if he decides to get updated covid vaccine.   Discussed labs for screening.  Recommend yearly vision exams and dental every 6 months.       Follow Up   Return in about 6 months (around 6/28/2024) for Recheck.  Patient was given instructions and counseling regarding his condition or for health maintenance advice. Please see specific information pulled into the AVS if appropriate.

## 2024-01-23 DIAGNOSIS — T78.40XA ALLERGY, INITIAL ENCOUNTER: ICD-10-CM

## 2024-01-24 RX ORDER — TIOTROPIUM BROMIDE AND OLODATEROL 3.124; 2.736 UG/1; UG/1
SPRAY, METERED RESPIRATORY (INHALATION)
Qty: 4 G | Refills: 1 | Status: SHIPPED | OUTPATIENT
Start: 2024-01-24

## 2024-01-24 RX ORDER — FEXOFENADINE HCL 180 MG/1
180 TABLET ORAL DAILY
Qty: 30 TABLET | Refills: 5 | Status: SHIPPED | OUTPATIENT
Start: 2024-01-24

## 2024-01-24 NOTE — TELEPHONE ENCOUNTER
Rx Refill Note  Requested Prescriptions     Pending Prescriptions Disp Refills    fexofenadine (ALLEGRA) 180 MG tablet [Pharmacy Med Name: FEXOFENADINE  MG TABLET] 30 tablet 1     Sig: TAKE 1 TABLET BY MOUTH DAILY    Stiolto Respimat 2.5-2.5 MCG/ACT aerosol solution inhaler [Pharmacy Med Name: STIOLTO RESPIMAT INHAL SPRAY] 4 g 1     Sig: INHALE 2 INHALATION(S) BY MOUTH DAILY      Last office visit with prescribing clinician: 12/28/2023   Last telemedicine visit with prescribing clinician: Visit date not found   Next office visit with prescribing clinician: 6/28/2024       Lisseth Ko MA  01/24/24, 07:53 EST

## 2024-01-29 DIAGNOSIS — G25.81 RESTLESS LEG SYNDROME: ICD-10-CM

## 2024-01-29 RX ORDER — ROPINIROLE 3 MG/1
3 TABLET, FILM COATED ORAL NIGHTLY
Qty: 30 TABLET | Refills: 0 | Status: SHIPPED | OUTPATIENT
Start: 2024-01-29

## 2024-01-29 NOTE — TELEPHONE ENCOUNTER
Rx Refill Note  Requested Prescriptions     Pending Prescriptions Disp Refills    rOPINIRole (REQUIP) 3 MG tablet [Pharmacy Med Name: rOPINIRole HCL 3 MG TABLET] 30 tablet 0     Sig: TAKE ONE TABLET BY MOUTH ONCE NIGHTLY ONE HOUR BEFORE BEDTIME      Last office visit with prescribing clinician: 12/28/2023   Last telemedicine visit with prescribing clinician: Visit date not found   Next office visit with prescribing clinician: 6/28/2024                         Would you like a call back once the refill request has been completed: [] Yes [] No    If the office needs to give you a call back, can they leave a voicemail: [] Yes [] No    Shelley Landaverde MA  01/29/24, 08:25 EST

## 2024-02-22 DIAGNOSIS — M25.551 CHRONIC PAIN OF RIGHT HIP: ICD-10-CM

## 2024-02-22 DIAGNOSIS — G89.29 CHRONIC PAIN OF RIGHT HIP: ICD-10-CM

## 2024-02-22 DIAGNOSIS — G25.81 RESTLESS LEG SYNDROME: ICD-10-CM

## 2024-02-22 RX ORDER — ROPINIROLE 3 MG/1
3 TABLET, FILM COATED ORAL NIGHTLY
Qty: 90 TABLET | Refills: 1 | Status: SHIPPED | OUTPATIENT
Start: 2024-02-22

## 2024-02-22 NOTE — TELEPHONE ENCOUNTER
Rx Refill Note  Requested Prescriptions     Pending Prescriptions Disp Refills    meloxicam (MOBIC) 7.5 MG tablet [Pharmacy Med Name: MELOXICAM 7.5 MG TABLET] 60 tablet 1     Sig: TAKE ONE TO TWO TABLETS BY MOUTH EVERY DAY AS NEEDED FOR MODERATE PAIN      Last office visit with prescribing clinician: 12/28/2023   Last telemedicine visit with prescribing clinician: Visit date not found   Next office visit with prescribing clinician: 6/28/2024       Lisseth Ko MA  02/22/24, 17:22 EST

## 2024-02-22 NOTE — TELEPHONE ENCOUNTER
Rx Refill Note  Requested Prescriptions     Pending Prescriptions Disp Refills    rOPINIRole (REQUIP) 3 MG tablet 30 tablet 0     Sig: Take 1 tablet by mouth Every Night.      Last office visit with prescribing clinician: 12/28/2023   Next office visit with prescribing clinician: 6/28/2024     Marilyn Yee MA  02/22/24, 08:30 EST    Last fill: 01/29/2024

## 2024-02-23 RX ORDER — MELOXICAM 7.5 MG/1
TABLET ORAL
Qty: 60 TABLET | Refills: 1 | Status: SHIPPED | OUTPATIENT
Start: 2024-02-23

## 2024-03-18 DIAGNOSIS — G25.81 RESTLESS LEG SYNDROME: ICD-10-CM

## 2024-03-18 RX ORDER — ROPINIROLE 3 MG/1
3 TABLET, FILM COATED ORAL NIGHTLY
Qty: 90 TABLET | Refills: 1 | Status: SHIPPED | OUTPATIENT
Start: 2024-03-18

## 2024-03-18 NOTE — TELEPHONE ENCOUNTER
Rx Refill Note  Requested Prescriptions     Pending Prescriptions Disp Refills    rOPINIRole (REQUIP) 2 MG tablet [Pharmacy Med Name: ROPINIROLE 2MG TABLETS] 90 tablet 0     Sig: TAKE 1 TABLET BY MOUTH EVERY NIGHT 1 HOUR BEFORE BEDTIME      Last office visit with prescribing clinician: 12/28/2023   Last telemedicine visit with prescribing clinician: Visit date not found   Next office visit with prescribing clinician: 6/28/2024                         Would you like a call back once the refill request has been completed: [] Yes [] No    If the office needs to give you a call back, can they leave a voicemail: [] Yes [] No    Giselle Avelar MA  03/18/24, 09:24 EDT

## 2024-04-19 DIAGNOSIS — K21.9 GASTROESOPHAGEAL REFLUX DISEASE WITHOUT ESOPHAGITIS: ICD-10-CM

## 2024-04-19 RX ORDER — OMEPRAZOLE 40 MG/1
40 CAPSULE, DELAYED RELEASE ORAL DAILY
Qty: 90 CAPSULE | Refills: 0 | Status: SHIPPED | OUTPATIENT
Start: 2024-04-19

## 2024-04-19 NOTE — TELEPHONE ENCOUNTER
Rx Refill Note  Requested Prescriptions     Pending Prescriptions Disp Refills    omeprazole (priLOSEC) 40 MG capsule [Pharmacy Med Name: OMEPRAZOLE DR 40 MG CAPSULE] 90 capsule 0     Sig: TAKE 1 CAPSULE BY MOUTH DAILY      Last office visit with prescribing clinician: 12/28/2023   Last telemedicine visit with prescribing clinician: Visit date not found   Next office visit with prescribing clinician: 6/28/2024     Milly Sullivan Rep  04/19/24, 07:45 EDT    Rx sent

## 2024-04-23 DIAGNOSIS — G89.29 CHRONIC PAIN OF RIGHT HIP: ICD-10-CM

## 2024-04-23 DIAGNOSIS — M25.551 CHRONIC PAIN OF RIGHT HIP: ICD-10-CM

## 2024-04-23 RX ORDER — MELOXICAM 7.5 MG/1
TABLET ORAL
Qty: 60 TABLET | Refills: 1 | Status: SHIPPED | OUTPATIENT
Start: 2024-04-23

## 2024-04-23 NOTE — TELEPHONE ENCOUNTER
Rx Refill Note  Requested Prescriptions     Pending Prescriptions Disp Refills    meloxicam (MOBIC) 7.5 MG tablet [Pharmacy Med Name: MELOXICAM 7.5 MG TABLET] 60 tablet 1     Sig: TAKE ONE TO TWO TABLETS BY MOUTH EVERY DAY AS NEEDED FOR MODERATE PAIN      Last office visit with prescribing clinician: 12/28/2023   Last telemedicine visit with prescribing clinician: Visit date not found   Next office visit with prescribing clinician: 6/28/2024                         Would you like a call back once the refill request has been completed: [] Yes [] No    If the office needs to give you a call back, can they leave a voicemail: [] Yes [] No    Giselle Avelar MA  04/23/24, 08:31 EDT

## 2024-04-26 DIAGNOSIS — T78.40XA ALLERGY, INITIAL ENCOUNTER: ICD-10-CM

## 2024-04-26 RX ORDER — FLUTICASONE PROPIONATE 50 MCG
SPRAY, SUSPENSION (ML) NASAL
Qty: 16 ML | Refills: 1 | Status: SHIPPED | OUTPATIENT
Start: 2024-04-26

## 2024-04-26 NOTE — TELEPHONE ENCOUNTER
Rx Refill Note  Requested Prescriptions     Pending Prescriptions Disp Refills    fluticasone (FLONASE) 50 MCG/ACT nasal spray [Pharmacy Med Name: FLUTICASONE PROP 50 MCG SPRAY] 16 mL      Sig: SPRAY 2 SPRAYS IN EACH NOSTRIL ONCE DAILY      Last office visit with prescribing clinician: 12/28/2023   Last telemedicine visit with prescribing clinician: Visit date not found   Next office visit with prescribing clinician: 6/28/2024                         Would you like a call back once the refill request has been completed: [] Yes [] No    If the office needs to give you a call back, can they leave a voicemail: [] Yes [] No    Giselle Avelar MA  04/26/24, 08:56 EDT

## 2024-05-09 ENCOUNTER — OFFICE VISIT (OUTPATIENT)
Age: 59
End: 2024-05-09
Payer: COMMERCIAL

## 2024-05-09 ENCOUNTER — LAB (OUTPATIENT)
Age: 59
End: 2024-05-09
Payer: COMMERCIAL

## 2024-05-09 VITALS
WEIGHT: 180.4 LBS | BODY MASS INDEX: 21.97 KG/M2 | OXYGEN SATURATION: 97 % | HEIGHT: 76 IN | HEART RATE: 103 BPM | SYSTOLIC BLOOD PRESSURE: 138 MMHG | DIASTOLIC BLOOD PRESSURE: 86 MMHG

## 2024-05-09 DIAGNOSIS — K30 UPSET STOMACH: ICD-10-CM

## 2024-05-09 DIAGNOSIS — R07.89 CHEST PRESSURE: Primary | ICD-10-CM

## 2024-05-09 DIAGNOSIS — F41.1 GAD (GENERALIZED ANXIETY DISORDER): ICD-10-CM

## 2024-05-09 DIAGNOSIS — R10.12 LEFT UPPER QUADRANT PAIN: ICD-10-CM

## 2024-05-09 DIAGNOSIS — R05.1 ACUTE COUGH: ICD-10-CM

## 2024-05-09 DIAGNOSIS — R10.12 ABDOMINAL PAIN, LEFT UPPER QUADRANT: Primary | ICD-10-CM

## 2024-05-09 LAB
ALBUMIN SERPL-MCNC: 4.3 G/DL (ref 3.5–5.2)
ALBUMIN/GLOB SERPL: 1.4 G/DL
ALP SERPL-CCNC: 98 U/L (ref 39–117)
ALT SERPL W P-5'-P-CCNC: 22 U/L (ref 1–41)
AMYLASE SERPL-CCNC: 56 U/L (ref 28–100)
ANION GAP SERPL CALCULATED.3IONS-SCNC: 15 MMOL/L (ref 5–15)
AST SERPL-CCNC: 17 U/L (ref 1–40)
BILIRUB SERPL-MCNC: 0.2 MG/DL (ref 0–1.2)
BUN SERPL-MCNC: 5 MG/DL (ref 6–20)
BUN/CREAT SERPL: 6.6 (ref 7–25)
CALCIUM SPEC-SCNC: 9.7 MG/DL (ref 8.6–10.5)
CHLORIDE SERPL-SCNC: 102 MMOL/L (ref 98–107)
CO2 SERPL-SCNC: 22 MMOL/L (ref 22–29)
CREAT SERPL-MCNC: 0.76 MG/DL (ref 0.76–1.27)
DEPRECATED RDW RBC AUTO: 40.7 FL (ref 37–54)
EGFRCR SERPLBLD CKD-EPI 2021: 104.8 ML/MIN/1.73
ERYTHROCYTE [DISTWIDTH] IN BLOOD BY AUTOMATED COUNT: 12.4 % (ref 12.3–15.4)
GLOBULIN UR ELPH-MCNC: 3.1 GM/DL
GLUCOSE SERPL-MCNC: 77 MG/DL (ref 65–99)
HCT VFR BLD AUTO: 43.5 % (ref 37.5–51)
HGB BLD-MCNC: 14.4 G/DL (ref 13–17.7)
LIPASE SERPL-CCNC: 35 U/L (ref 13–60)
MCH RBC QN AUTO: 29.9 PG (ref 26.6–33)
MCHC RBC AUTO-ENTMCNC: 33.1 G/DL (ref 31.5–35.7)
MCV RBC AUTO: 90.2 FL (ref 79–97)
PLATELET # BLD AUTO: 267 10*3/MM3 (ref 140–450)
PMV BLD AUTO: 11.1 FL (ref 6–12)
POTASSIUM SERPL-SCNC: 4.2 MMOL/L (ref 3.5–5.2)
PROT SERPL-MCNC: 7.4 G/DL (ref 6–8.5)
RBC # BLD AUTO: 4.82 10*6/MM3 (ref 4.14–5.8)
SODIUM SERPL-SCNC: 139 MMOL/L (ref 136–145)
WBC NRBC COR # BLD AUTO: 11.6 10*3/MM3 (ref 3.4–10.8)

## 2024-05-09 PROCEDURE — 85027 COMPLETE CBC AUTOMATED: CPT | Performed by: NURSE PRACTITIONER

## 2024-05-09 PROCEDURE — 82150 ASSAY OF AMYLASE: CPT | Performed by: NURSE PRACTITIONER

## 2024-05-09 PROCEDURE — 83690 ASSAY OF LIPASE: CPT | Performed by: NURSE PRACTITIONER

## 2024-05-09 PROCEDURE — 36415 COLL VENOUS BLD VENIPUNCTURE: CPT | Performed by: NURSE PRACTITIONER

## 2024-05-09 PROCEDURE — 80053 COMPREHEN METABOLIC PANEL: CPT | Performed by: NURSE PRACTITIONER

## 2024-05-09 PROCEDURE — 99214 OFFICE O/P EST MOD 30 MIN: CPT | Performed by: NURSE PRACTITIONER

## 2024-05-09 PROCEDURE — 93000 ELECTROCARDIOGRAM COMPLETE: CPT | Performed by: NURSE PRACTITIONER

## 2024-05-09 PROCEDURE — 1125F AMNT PAIN NOTED PAIN PRSNT: CPT | Performed by: NURSE PRACTITIONER

## 2024-05-09 RX ORDER — SUCRALFATE ORAL 1 G/10ML
1 SUSPENSION ORAL
Qty: 30 G | Refills: 0 | Status: SHIPPED | OUTPATIENT
Start: 2024-05-09

## 2024-05-09 RX ORDER — HYDROXYZINE HYDROCHLORIDE 25 MG/1
25 TABLET, FILM COATED ORAL 3 TIMES DAILY PRN
Qty: 60 TABLET | Refills: 0 | Status: SHIPPED | OUTPATIENT
Start: 2024-05-09

## 2024-05-09 RX ORDER — HYDROXYZINE HYDROCHLORIDE 25 MG/1
25 TABLET, FILM COATED ORAL 3 TIMES DAILY PRN
Qty: 60 TABLET | Refills: 0 | Status: SHIPPED | OUTPATIENT
Start: 2024-05-09 | End: 2024-05-09

## 2024-05-09 RX ORDER — BENZONATATE 100 MG/1
100 CAPSULE ORAL 3 TIMES DAILY PRN
Qty: 60 CAPSULE | Refills: 0 | Status: SHIPPED | OUTPATIENT
Start: 2024-05-09

## 2024-05-17 ENCOUNTER — OFFICE VISIT (OUTPATIENT)
Age: 59
End: 2024-05-17
Payer: COMMERCIAL

## 2024-05-17 VITALS
BODY MASS INDEX: 20.91 KG/M2 | OXYGEN SATURATION: 96 % | WEIGHT: 177.1 LBS | HEART RATE: 90 BPM | DIASTOLIC BLOOD PRESSURE: 98 MMHG | HEIGHT: 77 IN | TEMPERATURE: 98.2 F | SYSTOLIC BLOOD PRESSURE: 138 MMHG

## 2024-05-17 DIAGNOSIS — K30 UPSET STOMACH: ICD-10-CM

## 2024-05-17 DIAGNOSIS — R10.84 GENERALIZED ABDOMINAL PAIN: ICD-10-CM

## 2024-05-17 DIAGNOSIS — R93.89 ABNORMAL CHEST X-RAY: ICD-10-CM

## 2024-05-17 DIAGNOSIS — R11.2 NAUSEA AND VOMITING, UNSPECIFIED VOMITING TYPE: Primary | ICD-10-CM

## 2024-05-17 PROCEDURE — 99214 OFFICE O/P EST MOD 30 MIN: CPT | Performed by: NURSE PRACTITIONER

## 2024-05-17 PROCEDURE — 1125F AMNT PAIN NOTED PAIN PRSNT: CPT | Performed by: NURSE PRACTITIONER

## 2024-05-17 RX ORDER — SUCRALFATE 1 G/10ML
SUSPENSION ORAL
Qty: 30 ML | OUTPATIENT
Start: 2024-05-17

## 2024-05-17 RX ORDER — SUCRALFATE ORAL 1 G/10ML
1 SUSPENSION ORAL
Qty: 30 EACH | Refills: 0 | Status: SHIPPED | OUTPATIENT
Start: 2024-05-17 | End: 2024-06-16

## 2024-05-17 NOTE — PROGRESS NOTES
"Chief Complaint  Follow-up    Subjective        Stan Israel Jr. presents to [facility]  History of Present Illness  Pt is here today for follow up of stomach pain. He has been experiencing nausea and intermittent vomiting. He is experiencing indigestion as well. This has been a long term concern for him; but persistent and worsening over the last month. Will do H. Pylori breath test for rule out considering length of symptoms.     Pt reports he is experiencing worsening of RLS symptoms currently. Discussed adding a daily magnesium supplement.     Objective   Vital Signs:  /98 (BP Location: Right arm, Patient Position: Sitting, Cuff Size: Adult)   Pulse 90   Temp 98.2 °F (36.8 °C) (Infrared)   Ht 194.3 cm (76.5\")   Wt 80.3 kg (177 lb 1.6 oz)   SpO2 96%   BMI 21.28 kg/m²   Estimated body mass index is 21.28 kg/m² as calculated from the following:    Height as of this encounter: 194.3 cm (76.5\").    Weight as of this encounter: 80.3 kg (177 lb 1.6 oz).       BMI is within normal parameters. No other follow-up for BMI required.    Physical Exam  Vitals reviewed.   Constitutional:       Appearance: Normal appearance.   HENT:      Nose: Nose normal.      Mouth/Throat:      Mouth: Mucous membranes are moist.      Pharynx: Oropharynx is clear.   Eyes:      Conjunctiva/sclera: Conjunctivae normal.   Cardiovascular:      Rate and Rhythm: Normal rate and regular rhythm.      Heart sounds: Normal heart sounds.   Pulmonary:      Effort: Pulmonary effort is normal.      Breath sounds: Normal breath sounds.   Abdominal:      General: Bowel sounds are normal.      Palpations: Abdomen is soft.      Tenderness: There is abdominal tenderness.      Comments: Generalized abdominal tenderness.   Musculoskeletal:         General: Normal range of motion.      Cervical back: Normal range of motion.   Skin:     General: Skin is warm.   Neurological:      Mental Status: He is alert and " Bilateral buttock Stage 2 pressure injuries oriented to person, place, and time.   Psychiatric:         Mood and Affect: Mood normal.         Behavior: Behavior normal.         Thought Content: Thought content normal.        Result Review :                   Assessment and Plan     Diagnoses and all orders for this visit:    1. Nausea and vomiting, unspecified vomiting type (Primary)  -     H. Pylori Breath Test - Breath, Lung; Future  -     Ambulatory Referral to Gastroenterology  -     CT Abdomen Pelvis With & Without Contrast; Future    2. Abnormal chest x-ray  -     CT Chest Without Contrast; Future    3. Generalized abdominal pain  -     CT Abdomen Pelvis With & Without Contrast; Future    4. Upset stomach  -     sucralfate (Carafate) 1 GM/10ML suspension; Take 10 mL by mouth 3 (Three) Times a Day With Meals for 30 days.  Dispense: 30 each; Refill: 0             Follow Up     Return in about 2 weeks (around 5/31/2024) for Recheck.  Patient was given instructions and counseling regarding his condition or for health maintenance advice. Please see specific information pulled into the AVS if appropriate.          Dislodged PEG PEG tube replacement

## 2024-05-17 NOTE — TELEPHONE ENCOUNTER
Rx Refill Note  Requested Prescriptions     Pending Prescriptions Disp Refills    Carafate 1 GM/10ML suspension [Pharmacy Med Name: CARAFATE 1GM/10ML SUSPENSION] 30 mL      Sig: SHAKE LIQUID AND TAKE 10 ML BY MOUTH THREE TIMES DAILY WITH MEALS      Last office visit with prescribing clinician: 5/9/2024   Last telemedicine visit with prescribing clinician: Visit date not found   Next office visit with prescribing clinician: 6/28/2024                         Would you like a call back once the refill request has been completed: [] Yes [] No    If the office needs to give you a call back, can they leave a voicemail: [] Yes [] No    Giselle Avelar MA  05/17/24, 08:51 EDT

## 2024-05-25 ENCOUNTER — HOSPITAL ENCOUNTER (OUTPATIENT)
Facility: HOSPITAL | Age: 59
Discharge: HOME | End: 2024-05-25
Payer: COMMERCIAL

## 2024-05-25 DIAGNOSIS — R93.89 ABNORMAL CHEST X-RAY: ICD-10-CM

## 2024-05-25 DIAGNOSIS — R10.84 GENERALIZED ABDOMINAL PAIN: ICD-10-CM

## 2024-05-25 DIAGNOSIS — R11.2 NAUSEA AND VOMITING, UNSPECIFIED VOMITING TYPE: ICD-10-CM

## 2024-05-25 PROCEDURE — 25510000001 IOPAMIDOL 61 % SOLUTION: Performed by: NURSE PRACTITIONER

## 2024-05-25 PROCEDURE — 74178 CT ABD&PLV WO CNTR FLWD CNTR: CPT

## 2024-05-25 PROCEDURE — 71250 CT THORAX DX C-: CPT

## 2024-05-25 RX ADMIN — IOPAMIDOL 100 ML: 612 INJECTION, SOLUTION INTRAVENOUS at 10:06

## 2024-06-03 ENCOUNTER — OUTSIDE FACILITY SERVICE (OUTPATIENT)
Dept: GASTROENTEROLOGY | Facility: CLINIC | Age: 59
End: 2024-06-03
Payer: COMMERCIAL

## 2024-06-03 PROCEDURE — 88305 TISSUE EXAM BY PATHOLOGIST: CPT | Performed by: INTERNAL MEDICINE

## 2024-06-04 ENCOUNTER — LAB REQUISITION (OUTPATIENT)
Dept: LAB | Facility: HOSPITAL | Age: 59
End: 2024-06-04
Payer: COMMERCIAL

## 2024-06-04 ENCOUNTER — OFFICE VISIT (OUTPATIENT)
Age: 59
End: 2024-06-04
Payer: COMMERCIAL

## 2024-06-04 VITALS
WEIGHT: 184.1 LBS | BODY MASS INDEX: 21.74 KG/M2 | HEIGHT: 77 IN | SYSTOLIC BLOOD PRESSURE: 136 MMHG | DIASTOLIC BLOOD PRESSURE: 92 MMHG

## 2024-06-04 DIAGNOSIS — K02.9 DENTAL CARIES: ICD-10-CM

## 2024-06-04 DIAGNOSIS — R11.2 NAUSEA AND VOMITING, UNSPECIFIED VOMITING TYPE: ICD-10-CM

## 2024-06-04 DIAGNOSIS — G25.81 RLS (RESTLESS LEGS SYNDROME): Primary | ICD-10-CM

## 2024-06-04 DIAGNOSIS — R10.84 GENERALIZED ABDOMINAL PAIN: ICD-10-CM

## 2024-06-04 PROCEDURE — 1125F AMNT PAIN NOTED PAIN PRSNT: CPT | Performed by: NURSE PRACTITIONER

## 2024-06-04 PROCEDURE — 99214 OFFICE O/P EST MOD 30 MIN: CPT | Performed by: NURSE PRACTITIONER

## 2024-06-04 RX ORDER — ONDANSETRON 4 MG/1
4 TABLET, ORALLY DISINTEGRATING ORAL EVERY 8 HOURS PRN
Qty: 12 TABLET | Refills: 0 | Status: SHIPPED | OUTPATIENT
Start: 2024-06-04

## 2024-06-04 NOTE — PROGRESS NOTES
"Chief Complaint  Sinus Problem (Felt swelling on roof of mouth, feels like it could be allergy related), Vomiting (Carafate seems to be helping), Nausea (Still battling nausea everyday), and Restless Legs Syndrome (Feels Requip not working)    Subjective        Stan Israel Jr. presents to [facility]  History of Present Illness  Pt is here today for follow up of nausea. He had EGD yesterday; hiatal hernia identified. He has been taking omeprazole, but had been taking with regular medication. He has started taking 30 minutes prior to eating; started today. It was also recommended by GI that he take juwan root bid. He will pick that up today. Currently waiting on EGD biopsy results.     Pt is taking ropinirole for RLS; states it is not helping. He has never taken Magnesium supplements that he can remember.     He has some swelling in the upper roof of the mouth. He reports a large lump yesterday. He as concerned about a sinus infection. He has had this issue in the past. He is not experiencing pain in the area. He does have discomfort with eating when swelling occurs. He will schedule appt with dentist.      Objective   Vital Signs:  /92 (BP Location: Right arm, Patient Position: Sitting, Cuff Size: Adult)   Ht 194.3 cm (76.5\")   Wt 83.5 kg (184 lb 1.6 oz)   BMI 22.12 kg/m²   Estimated body mass index is 22.12 kg/m² as calculated from the following:    Height as of this encounter: 194.3 cm (76.5\").    Weight as of this encounter: 83.5 kg (184 lb 1.6 oz).       BMI is within normal parameters. No other follow-up for BMI required.      Physical Exam  Vitals reviewed.   Constitutional:       Appearance: Normal appearance.   HENT:      Right Ear: Tympanic membrane, ear canal and external ear normal.      Left Ear: Tympanic membrane, ear canal and external ear normal.      Nose: Nose normal.      Mouth/Throat:      Mouth: Mucous membranes are moist.      Dentition: Dental " tenderness and dental caries present.      Pharynx: Oropharynx is clear.   Eyes:      Conjunctiva/sclera: Conjunctivae normal.   Cardiovascular:      Rate and Rhythm: Normal rate and regular rhythm.      Heart sounds: Normal heart sounds.   Pulmonary:      Breath sounds: Normal breath sounds.   Musculoskeletal:         General: Normal range of motion.      Cervical back: Normal range of motion.   Skin:     General: Skin is warm.   Neurological:      Mental Status: He is alert and oriented to person, place, and time.   Psychiatric:         Mood and Affect: Mood normal.         Behavior: Behavior normal.         Thought Content: Thought content normal.        Result Review :                     Assessment and Plan     Diagnoses and all orders for this visit:    1. RLS (restless legs syndrome) (Primary)  -     magnesium oxide 250 MG tablet; Take 1 tablet by mouth Daily.  Dispense: 90 tablet; Refill: 0    2. Nausea and vomiting, unspecified vomiting type  -     ondansetron ODT (ZOFRAN-ODT) 4 MG disintegrating tablet; Place 1 tablet on the tongue Every 8 (Eight) Hours As Needed for Nausea.  Dispense: 12 tablet; Refill: 0   - Ginger root bid as recommended by GI.     3. Dental caries   - Schedule appt with dentist            Follow Up     Return in about 6 months (around 12/4/2024) for Annual.  Patient was given instructions and counseling regarding his condition or for health maintenance advice. Please see specific information pulled into the AVS if appropriate.

## 2024-06-06 LAB
CYTO UR: NORMAL
LAB AP CASE REPORT: NORMAL
LAB AP CLINICAL INFORMATION: NORMAL
LAB AP DIAGNOSIS COMMENT: NORMAL
PATH REPORT.FINAL DX SPEC: NORMAL
PATH REPORT.GROSS SPEC: NORMAL

## 2024-06-20 DIAGNOSIS — M25.551 CHRONIC PAIN OF RIGHT HIP: ICD-10-CM

## 2024-06-20 DIAGNOSIS — G89.29 CHRONIC PAIN OF RIGHT HIP: ICD-10-CM

## 2024-06-21 RX ORDER — MELOXICAM 7.5 MG/1
TABLET ORAL
Qty: 60 TABLET | Refills: 1 | Status: SHIPPED | OUTPATIENT
Start: 2024-06-21

## 2024-06-21 NOTE — TELEPHONE ENCOUNTER
APPROVED Rx Refill Note  Requested Prescriptions     Pending Prescriptions Disp Refills    meloxicam (MOBIC) 7.5 MG tablet [Pharmacy Med Name: MELOXICAM 7.5 MG TABLET] 60 tablet 1     Sig: TAKE ONE TO TWO TABLETS BY MOUTH EVERY DAY AS NEEDED FOR MODERATE PAIN      Last office visit with prescribing clinician: 6/4/2024   Last telemedicine visit with prescribing clinician: Visit date not found   Next office visit with prescribing clinician: 6/28/2024                         Would you like a call back once the refill request has been completed: [] Yes [] No    If the office needs to give you a call back, can they leave a voicemail: [] Yes [] No    Ervin Petersen MA  06/21/24, 07:30 EDT

## 2024-07-02 DIAGNOSIS — F41.1 GAD (GENERALIZED ANXIETY DISORDER): ICD-10-CM

## 2024-07-02 RX ORDER — HYDROXYZINE HYDROCHLORIDE 25 MG/1
25 TABLET, FILM COATED ORAL 3 TIMES DAILY PRN
Qty: 60 TABLET | Refills: 0 | Status: SHIPPED | OUTPATIENT
Start: 2024-07-02

## 2024-07-02 NOTE — TELEPHONE ENCOUNTER
APPROVED Rx Refill Note  Requested Prescriptions     Pending Prescriptions Disp Refills    hydrOXYzine (ATARAX) 25 MG tablet [Pharmacy Med Name: HYDROXYZINE HCL 25MG TABS (WHITE)] 60 tablet 0     Sig: TAKE 1 TABLET BY MOUTH THREE TIMES DAILY AS NEEDED FOR ANXIETY      Last office visit with prescribing clinician: 6/4/2024   Last telemedicine visit with prescribing clinician: Visit date not found   Next office visit with prescribing clinician: 12/27/2024                         Would you like a call back once the refill request has been completed: [] Yes [] No    If the office needs to give you a call back, can they leave a voicemail: [] Yes [] No    Ervin Petersen MA  07/02/24, 15:09 EDT

## 2024-07-03 DIAGNOSIS — G25.81 RLS (RESTLESS LEGS SYNDROME): ICD-10-CM

## 2024-07-03 DIAGNOSIS — F41.1 GAD (GENERALIZED ANXIETY DISORDER): ICD-10-CM

## 2024-07-03 NOTE — TELEPHONE ENCOUNTER
Caller: Stan Israel Jr.    Relationship: Self    Best call back number: [phone]     Requested Prescriptions:   Requested Prescriptions     Pending Prescriptions Disp Refills    magnesium oxide 250 MG tablet 90 tablet 0     Sig: Take 1 tablet by mouth Daily.    hydrOXYzine (ATARAX) 25 MG tablet 60 tablet 0     Sig: Take 1 tablet by mouth 3 (Three) Times a Day As Needed. for anxiety        Pharmacy where request should be sent: [facility][id number] [address] [phone] [facility] [phone] FX     Last office visit with prescribing clinician: 6/4/2024   Last telemedicine visit with prescribing clinician: Visit date not found   Next office visit with prescribing clinician: 7/5/2024     Additional details provided by patient: NO MEDICATION ON HAND      Does the patient have less than a 3 day supply:  [x] Yes  [] No    Would you like a call back once the refill request has been completed: [x] Yes [] No    If the office needs to give you a call back, can they leave a voicemail: [] Yes [] No    Milly Short   07/03/24 14:28 EDT

## 2024-07-05 RX ORDER — HYDROXYZINE HYDROCHLORIDE 25 MG/1
25 TABLET, FILM COATED ORAL 3 TIMES DAILY PRN
Qty: 60 TABLET | Refills: 0 | OUTPATIENT
Start: 2024-07-05

## 2024-07-11 ENCOUNTER — TELEPHONE (OUTPATIENT)
Age: 59
End: 2024-07-11

## 2024-07-11 ENCOUNTER — TELEPHONE (OUTPATIENT)
Age: 59
End: 2024-07-11
Payer: COMMERCIAL

## 2024-07-19 DIAGNOSIS — K21.9 GASTROESOPHAGEAL REFLUX DISEASE WITHOUT ESOPHAGITIS: ICD-10-CM

## 2024-07-19 RX ORDER — OMEPRAZOLE 40 MG/1
40 CAPSULE, DELAYED RELEASE ORAL DAILY
Qty: 90 CAPSULE | Refills: 0 | Status: SHIPPED | OUTPATIENT
Start: 2024-07-19

## 2024-08-19 DIAGNOSIS — G25.81 RESTLESS LEG SYNDROME: ICD-10-CM

## 2024-08-19 DIAGNOSIS — M25.551 CHRONIC PAIN OF RIGHT HIP: ICD-10-CM

## 2024-08-19 DIAGNOSIS — G89.29 CHRONIC PAIN OF RIGHT HIP: ICD-10-CM

## 2024-08-19 DIAGNOSIS — T78.40XA ALLERGY, INITIAL ENCOUNTER: ICD-10-CM

## 2024-08-19 DIAGNOSIS — R05.1 ACUTE COUGH: ICD-10-CM

## 2024-08-19 DIAGNOSIS — F41.1 GAD (GENERALIZED ANXIETY DISORDER): ICD-10-CM

## 2024-08-19 RX ORDER — ROPINIROLE 3 MG/1
3 TABLET, FILM COATED ORAL NIGHTLY
Qty: 90 TABLET | Refills: 1 | Status: SHIPPED | OUTPATIENT
Start: 2024-08-19

## 2024-08-19 RX ORDER — HYDROXYZINE HYDROCHLORIDE 25 MG/1
25 TABLET, FILM COATED ORAL 3 TIMES DAILY PRN
Qty: 60 TABLET | Refills: 0 | Status: CANCELLED | OUTPATIENT
Start: 2024-08-19

## 2024-08-19 RX ORDER — BENZONATATE 100 MG/1
100 CAPSULE ORAL 3 TIMES DAILY PRN
Qty: 60 CAPSULE | Refills: 0 | Status: CANCELLED | OUTPATIENT
Start: 2024-08-19

## 2024-08-19 RX ORDER — FEXOFENADINE HCL 180 MG/1
180 TABLET ORAL DAILY
Qty: 30 TABLET | Refills: 5 | Status: CANCELLED | OUTPATIENT
Start: 2024-08-19

## 2024-08-19 NOTE — TELEPHONE ENCOUNTER
Caller: Stan Israel Jr.    Relationship: Self    Best call back number: [phone]    Requested Prescriptions:   Requested Prescriptions     Pending Prescriptions Disp Refills    fexofenadine (ALLEGRA) 180 MG tablet 30 tablet 5     Sig: Take 1 tablet by mouth Daily.    benzonatate (Tessalon Perles) 100 MG capsule 60 capsule 0     Sig: Take 1 capsule by mouth 3 (Three) Times a Day As Needed for Cough.    meloxicam (MOBIC) 7.5 MG tablet 60 tablet 1    hydrOXYzine (ATARAX) 25 MG tablet 60 tablet 0     Sig: Take 1 tablet by mouth 3 (Three) Times a Day As Needed. for anxiety      sucralfate (Carafate) 1 GM/10ML suspension     [id number] - [address] [phone] [facility] [phone] FX     Last office visit with prescribing clinician: 6/4/2024   Last telemedicine visit with prescribing clinician: Visit date not found   Next office visit with prescribing clinician: 8/20/2024     Additional details provided by patient: sucralfate (Carafate) 1 GM/10ML suspension     Does the patient have less than a 3 day supply:  [x] Yes  [] No    Would you like a call back once the refill request has been completed: [] Yes [x] No    If the office needs to give you a call back, can they leave a voicemail: [] Yes [x] No    Rosette Bowie MA   08/19/24 11:30 EDT

## 2024-08-20 ENCOUNTER — LAB (OUTPATIENT)
Age: 59
End: 2024-08-20
Payer: COMMERCIAL

## 2024-08-20 ENCOUNTER — OFFICE VISIT (OUTPATIENT)
Age: 59
End: 2024-08-20
Payer: COMMERCIAL

## 2024-08-20 ENCOUNTER — TELEPHONE (OUTPATIENT)
Age: 59
End: 2024-08-20

## 2024-08-20 VITALS
BODY MASS INDEX: 21.61 KG/M2 | SYSTOLIC BLOOD PRESSURE: 124 MMHG | DIASTOLIC BLOOD PRESSURE: 86 MMHG | HEIGHT: 76 IN | WEIGHT: 177.5 LBS

## 2024-08-20 DIAGNOSIS — G89.29 CHRONIC RIGHT HIP PAIN: ICD-10-CM

## 2024-08-20 DIAGNOSIS — R05.1 ACUTE COUGH: ICD-10-CM

## 2024-08-20 DIAGNOSIS — G89.29 CHRONIC PAIN OF RIGHT HIP: ICD-10-CM

## 2024-08-20 DIAGNOSIS — G25.81 RESTLESS LEG SYNDROME: ICD-10-CM

## 2024-08-20 DIAGNOSIS — G25.81 RESTLESS LEGS: Primary | ICD-10-CM

## 2024-08-20 DIAGNOSIS — T78.40XA ALLERGY, INITIAL ENCOUNTER: ICD-10-CM

## 2024-08-20 DIAGNOSIS — M25.551 CHRONIC RIGHT HIP PAIN: ICD-10-CM

## 2024-08-20 DIAGNOSIS — F41.1 GAD (GENERALIZED ANXIETY DISORDER): ICD-10-CM

## 2024-08-20 DIAGNOSIS — M25.551 CHRONIC PAIN OF RIGHT HIP: ICD-10-CM

## 2024-08-20 DIAGNOSIS — K21.9 GASTROESOPHAGEAL REFLUX DISEASE WITHOUT ESOPHAGITIS: ICD-10-CM

## 2024-08-20 DIAGNOSIS — G25.81 RESTLESS LEG SYNDROME: Primary | ICD-10-CM

## 2024-08-20 DIAGNOSIS — G25.81 RLS (RESTLESS LEGS SYNDROME): ICD-10-CM

## 2024-08-20 PROCEDURE — 1125F AMNT PAIN NOTED PAIN PRSNT: CPT | Performed by: NURSE PRACTITIONER

## 2024-08-20 PROCEDURE — 85027 COMPLETE CBC AUTOMATED: CPT | Performed by: NURSE PRACTITIONER

## 2024-08-20 PROCEDURE — 84466 ASSAY OF TRANSFERRIN: CPT | Performed by: NURSE PRACTITIONER

## 2024-08-20 PROCEDURE — 83540 ASSAY OF IRON: CPT | Performed by: NURSE PRACTITIONER

## 2024-08-20 PROCEDURE — 99214 OFFICE O/P EST MOD 30 MIN: CPT | Performed by: NURSE PRACTITIONER

## 2024-08-20 PROCEDURE — 36415 COLL VENOUS BLD VENIPUNCTURE: CPT | Performed by: NURSE PRACTITIONER

## 2024-08-20 PROCEDURE — 82728 ASSAY OF FERRITIN: CPT | Performed by: NURSE PRACTITIONER

## 2024-08-20 RX ORDER — SUCRALFATE ORAL 1 G/10ML
1 SUSPENSION ORAL 2 TIMES DAILY
Qty: 30 EACH | Refills: 0 | Status: SHIPPED | OUTPATIENT
Start: 2024-08-20 | End: 2024-08-20

## 2024-08-20 RX ORDER — OMEPRAZOLE 40 MG/1
40 CAPSULE, DELAYED RELEASE ORAL DAILY
Qty: 90 CAPSULE | Refills: 0 | Status: SHIPPED | OUTPATIENT
Start: 2024-08-20

## 2024-08-20 RX ORDER — MELOXICAM 7.5 MG/1
TABLET ORAL
Qty: 60 TABLET | Refills: 1 | OUTPATIENT
Start: 2024-08-20

## 2024-08-20 RX ORDER — SUCRALFATE ORAL 1 G/10ML
1 SUSPENSION ORAL 2 TIMES DAILY
Qty: 414 ML | Refills: 0 | Status: SHIPPED | OUTPATIENT
Start: 2024-08-20

## 2024-08-20 RX ORDER — HYDROXYZINE HYDROCHLORIDE 25 MG/1
25 TABLET, FILM COATED ORAL 3 TIMES DAILY PRN
Qty: 60 TABLET | Refills: 0 | Status: SHIPPED | OUTPATIENT
Start: 2024-08-20

## 2024-08-20 RX ORDER — BENZONATATE 100 MG/1
100 CAPSULE ORAL 3 TIMES DAILY PRN
Qty: 60 CAPSULE | Refills: 0 | Status: SHIPPED | OUTPATIENT
Start: 2024-08-20

## 2024-08-20 RX ORDER — FEXOFENADINE HCL 180 MG/1
180 TABLET ORAL DAILY
Qty: 90 TABLET | Refills: 1 | Status: SHIPPED | OUTPATIENT
Start: 2024-08-20

## 2024-08-20 RX ORDER — MELOXICAM 7.5 MG/1
7.5 TABLET ORAL DAILY
Qty: 60 TABLET | Refills: 1 | Status: SHIPPED | OUTPATIENT
Start: 2024-08-20

## 2024-08-20 NOTE — TELEPHONE ENCOUNTER
Caller: [id number] - [place], [place] - [address] AT [facility] [address]  - [phone] [facility] [phone] FX    Relationship: Pharmacy    Best call back number: [phone]     Which medication are you concerned about:     sucralfate (Carafate) 1 GM/10ML suspension       Who prescribed you this medication: DR. REDMOND      What are your concerns: ARIANA FROM THE PHARMACY WANTS TO KNOW IF DOSAGE AMOUNT IS CORRECT. AS WRITTEN, THE LENGTH OF THE TREATMENT WOULD BE A DAY AND A HALF. SHOULD IT BE LONGER OR FILL FOR THAT AMOUNT OF TIME.

## 2024-08-20 NOTE — PROGRESS NOTES
"Chief Complaint  Restless Legs Syndrome (Worse, keeping pt up at night, painful), Anxiety (Mostly improved), Abdominal Pain (Mostly improved), and Med Refill (Hydroxyzine, omeprazole, magnesium, allegra)    Subjective        Stan Hennessyjose Rodriguez presents to [facility]  History of Present Illness  Pt is here today for follow up. He states his RLS is getting worse at night. He reports excruciating pain at night and states at times he has to  his leg and move it. He has history of RLS, but this seems to be different. He states this starts in his hip. He went to PT and it got better, but once therapy ended pain returned. He is willing to try PT again. I am also going to refer to Ortho due to concern of on-going pain and the worsening of symptoms.     Pt needs refills for allergy medication that he takes daily. He also would like to continue carafate prn for nausea; he states it has been helpful for him. He takes hydroxyzine prn for anxiety. Omeprazole will be sent for GERD; this has worked well for him.    Objective   Vital Signs:  /86 (BP Location: Right arm, Patient Position: Sitting, Cuff Size: Adult)   Ht 194.3 cm (76.5\")   Wt 80.5 kg (177 lb 8 oz)   BMI 21.33 kg/m²   Estimated body mass index is 21.33 kg/m² as calculated from the following:    Height as of this encounter: 194.3 cm (76.5\").    Weight as of this encounter: 80.5 kg (177 lb 8 oz).    BMI is within normal parameters. No other follow-up for BMI required.    Physical Exam  Vitals reviewed.   Constitutional:       Appearance: Normal appearance.   HENT:      Nose: Nose normal.      Mouth/Throat:      Pharynx: Oropharynx is clear.   Eyes:      Conjunctiva/sclera: Conjunctivae normal.   Cardiovascular:      Rate and Rhythm: Normal rate and regular rhythm.      Heart sounds: Normal heart sounds.   Pulmonary:      Effort: Pulmonary effort is normal.      Breath sounds: Normal breath sounds.   Musculoskeletal:    "      General: Normal range of motion.      Cervical back: Normal range of motion.   Skin:     General: Skin is warm.   Neurological:      Mental Status: He is alert and oriented to person, place, and time.   Psychiatric:         Mood and Affect: Mood normal.         Behavior: Behavior normal.         Thought Content: Thought content normal.        Result Review :                Assessment and Plan   Diagnoses and all orders for this visit:    1. Restless leg syndrome (Primary)  -     CBC (No Diff); Future  -     Ferritin; Future  -     Iron Profile; Future  -     Ambulatory Referral to Physical Therapy for Evaluation & Treatment  -     Ambulatory Referral to Orthopedic Surgery    2. Chronic right hip pain  -     Ambulatory Referral to Physical Therapy for Evaluation & Treatment  -     Ambulatory Referral to Orthopedic Surgery    3. Allergy, initial encounter  -     fexofenadine (ALLEGRA) 180 MG tablet; Take 1 tablet by mouth Daily.  Dispense: 90 tablet; Refill: 1    4. RLS (restless legs syndrome)  -     magnesium oxide 250 MG tablet; Take 1 tablet by mouth Daily.  Dispense: 90 tablet; Refill: 0    5. MARYA (generalized anxiety disorder)  -     hydrOXYzine (ATARAX) 25 MG tablet; Take 1 tablet by mouth 3 (Three) Times a Day As Needed for Anxiety. for anxiety  Dispense: 60 tablet; Refill: 0    6. Gastroesophageal reflux disease without esophagitis  -     omeprazole (priLOSEC) 40 MG capsule; Take 1 capsule by mouth Daily.  Dispense: 90 capsule; Refill: 0  -     Discontinue: sucralfate (Carafate) 1 GM/10ML suspension; Take 10 mL by mouth 2 (Two) Times a Day.  Dispense: 30 each; Refill: 0  -     sucralfate (Carafate) 1 GM/10ML suspension; Take 10 mL by mouth 2 (Two) Times a Day.  Dispense: 414 mL; Refill: 0    7. Acute cough  -     benzonatate (Tessalon Perles) 100 MG capsule; Take 1 capsule by mouth 3 (Three) Times a Day As Needed for Cough.  Dispense: 60 capsule; Refill: 0    8. Chronic pain of right hip  -     meloxicam  (MOBIC) 7.5 MG tablet; Take 1 tablet by mouth Daily.  Dispense: 60 tablet; Refill: 1           Follow Up   Return in about 4 months (around 12/20/2024) for Annual.  Patient was given instructions and counseling regarding his condition or for health maintenance advice. Please see specific information pulled into the AVS if appropriate.

## 2024-08-21 LAB
DEPRECATED RDW RBC AUTO: 43.1 FL (ref 37–54)
ERYTHROCYTE [DISTWIDTH] IN BLOOD BY AUTOMATED COUNT: 13 % (ref 12.3–15.4)
FERRITIN SERPL-MCNC: 271 NG/ML (ref 30–400)
HCT VFR BLD AUTO: 44 % (ref 37.5–51)
HGB BLD-MCNC: 14.8 G/DL (ref 13–17.7)
IRON 24H UR-MRATE: 113 MCG/DL (ref 59–158)
IRON SATN MFR SERPL: 30 % (ref 20–50)
MCH RBC QN AUTO: 30.5 PG (ref 26.6–33)
MCHC RBC AUTO-ENTMCNC: 33.6 G/DL (ref 31.5–35.7)
MCV RBC AUTO: 90.7 FL (ref 79–97)
PLATELET # BLD AUTO: 244 10*3/MM3 (ref 140–450)
PMV BLD AUTO: 11.1 FL (ref 6–12)
RBC # BLD AUTO: 4.85 10*6/MM3 (ref 4.14–5.8)
TIBC SERPL-MCNC: 381 MCG/DL (ref 298–536)
TRANSFERRIN SERPL-MCNC: 256 MG/DL (ref 200–360)
WBC NRBC COR # BLD AUTO: 7.53 10*3/MM3 (ref 3.4–10.8)

## 2024-09-03 ENCOUNTER — OFFICE VISIT (OUTPATIENT)
Dept: ORTHOPEDIC SURGERY | Facility: CLINIC | Age: 59
End: 2024-09-03
Payer: COMMERCIAL

## 2024-09-03 VITALS
HEIGHT: 76 IN | DIASTOLIC BLOOD PRESSURE: 70 MMHG | SYSTOLIC BLOOD PRESSURE: 128 MMHG | WEIGHT: 171 LBS | BODY MASS INDEX: 20.82 KG/M2

## 2024-09-03 DIAGNOSIS — Z72.0 TOBACCO ABUSE: ICD-10-CM

## 2024-09-03 DIAGNOSIS — M16.11 PRIMARY OSTEOARTHRITIS OF RIGHT HIP: Primary | ICD-10-CM

## 2024-09-03 DIAGNOSIS — M25.551 RIGHT HIP PAIN: ICD-10-CM

## 2024-09-03 PROCEDURE — 99406 BEHAV CHNG SMOKING 3-10 MIN: CPT | Performed by: ORTHOPAEDIC SURGERY

## 2024-09-03 PROCEDURE — 1160F RVW MEDS BY RX/DR IN RCRD: CPT | Performed by: ORTHOPAEDIC SURGERY

## 2024-09-03 PROCEDURE — 99244 OFF/OP CNSLTJ NEW/EST MOD 40: CPT | Performed by: ORTHOPAEDIC SURGERY

## 2024-09-03 PROCEDURE — 1159F MED LIST DOCD IN RCRD: CPT | Performed by: ORTHOPAEDIC SURGERY

## 2024-09-03 NOTE — LETTER
September 3, 2024       No Recipients    Patient: Stan Israel Jr.   YOB: 1965   Date of Visit: 9/3/2024       Dear ROSALIO Dash:    Thank you for referring Stan Israel to me for evaluation. Below are the relevant portions of my assessment and plan of care.    Encounter Diagnosis and Orders:  Diagnoses and all orders for this visit:    1. Primary osteoarthritis of right hip (Primary)    2. Right hip pain  -     XR Hip With or Without Pelvis 2 - 3 View Right    3. Tobacco abuse        If you have questions, please do not hesitate to call me. I look forward to following Stan along with you.         Sincerely,        David Cherry MD        CC:   No Recipients

## 2024-09-03 NOTE — PROGRESS NOTES
Orthopaedic Clinic Note: Hip New Patient    Chief Complaint   Patient presents with    Right Hip - Pain, Initial Evaluation        HPI  Consult from: ROSALIO Dashjose Rodriguez is a 58 y.o. male who presents with right hip pain for 2 year(s). Onset mechanical fall. Pain is localized to groin, lateral trochanter, and gluteal region and is a 10/10 on the pain scale.Pain is described as throbbing and shooting. Associated symptoms include pain, popping, grinding, and stiffness.  The pain is worse with any movement of the joint; pain medication and/or NSAID improve the pain. Previous treatments have included: Meloxicam since symptom onset. Although some transient relief was reported with these interventions, these conservative measures have failed and symptoms have persisted. The patient is limited in daily activities and has had a significant decrease in quality of life as a result. He denies fevers, chills, or constitutional symptoms.  He does smoke and vape daily.    I have reviewed the following portions of the patient's history:History of Present Illness    Past Medical History:   Diagnosis Date    ADHD (attention deficit hyperactivity disorder)     Allergic     Anxiety     Arthritis     Asthma     Depression     GERD (gastroesophageal reflux disease)     Headache       Past Surgical History:   Procedure Laterality Date    HERNIA REPAIR        Family History   Problem Relation Age of Onset    Breast cancer Mother     Cancer Mother     Arthritis Mother     Alcohol abuse Mother     COPD Mother     Early death Mother     Colon cancer Father     Cancer Father     Arthritis Father     Stroke Father     Vision loss Father     Breast cancer Sister     Cancer Sister     Arthritis Sister     Arthritis Sister     Kidney disease Sister     Arthritis Sister     Thyroid disease Sister     Arthritis Sister         Cancer    Diabetes Sister         Thu also    Ovarian cancer Neg Hx      Social History      Socioeconomic History    Marital status: Single   Tobacco Use    Smoking status: Every Day     Current packs/day: 0.50     Average packs/day: 0.5 packs/day for 42.7 years (21.3 ttl pk-yrs)     Types: Cigars, Cigarettes     Start date: 1/1/1982     Passive exposure: Never    Smokeless tobacco: Never   Vaping Use    Vaping status: Never Used   Substance and Sexual Activity    Alcohol use: Yes     Alcohol/week: 4.0 standard drinks of alcohol     Types: 2 Cans of beer, 2 Shots of liquor per week    Drug use: Yes     Frequency: 7.0 times per week     Types: Marijuana    Sexual activity: Yes     Partners: Female     Birth control/protection: Condom, Partner of same sex      Current Outpatient Medications on File Prior to Visit   Medication Sig Dispense Refill    albuterol sulfate  (90 Base) MCG/ACT inhaler Inhale 2 puffs Every 4 (Four) Hours As Needed for Wheezing. 18 g 1    benzonatate (Tessalon Perles) 100 MG capsule Take 1 capsule by mouth 3 (Three) Times a Day As Needed for Cough. 60 capsule 0    fexofenadine (ALLEGRA) 180 MG tablet Take 1 tablet by mouth Daily. 90 tablet 1    fluticasone (FLONASE) 50 MCG/ACT nasal spray SPRAY 2 SPRAYS IN EACH NOSTRIL ONCE DAILY 16 mL 1    hydrOXYzine (ATARAX) 25 MG tablet Take 1 tablet by mouth 3 (Three) Times a Day As Needed for Anxiety. for anxiety 60 tablet 0    magnesium oxide 250 MG tablet Take 1 tablet by mouth Daily. 90 tablet 0    meloxicam (MOBIC) 7.5 MG tablet Take 1 tablet by mouth Daily. 60 tablet 1    omeprazole (priLOSEC) 40 MG capsule Take 1 capsule by mouth Daily. 90 capsule 0    ondansetron ODT (ZOFRAN-ODT) 4 MG disintegrating tablet Place 1 tablet on the tongue Every 8 (Eight) Hours As Needed for Nausea. 12 tablet 0    rOPINIRole (REQUIP) 3 MG tablet TAKE ONE TABLET BY MOUTH ONCE NIGHTLY 90 tablet 1    Stiolto Respimat 2.5-2.5 MCG/ACT aerosol solution inhaler INHALE 2 INHALATION(S) BY MOUTH DAILY 4 g 1    sucralfate (Carafate) 1 GM/10ML suspension Take 10  "mL by mouth 2 (Two) Times a Day. 414 mL 0     No current facility-administered medications on file prior to visit.      Allergies   Allergen Reactions    Lisinopril Angioedema        Review of Systems   Constitutional: Negative.    HENT: Negative.     Eyes: Negative.    Respiratory: Negative.     Cardiovascular: Negative.    Gastrointestinal: Negative.    Endocrine: Negative.    Genitourinary: Negative.    Musculoskeletal:  Positive for arthralgias.   Skin: Negative.    Allergic/Immunologic: Negative.    Neurological: Negative.    Hematological: Negative.    Psychiatric/Behavioral: Negative.          The patient's Review of Systems was personally reviewed and confirmed as accurate.    The following portions of the patient's history were reviewed and updated as appropriate: allergies, current medications, past family history, past medical history, past social history, past surgical history, and problem list.    Physical Exam  Blood pressure 128/70, height 193.4 cm (76.14\"), weight 77.6 kg (171 lb).    Body mass index is 20.74 kg/m².    GENERAL APPEARANCE: awake, alert & oriented x 3, in no acute distress and well developed, well nourished  PSYCH: normal affect  LUNGS:  breathing nonlabored  EYES: sclera anicteric  CARDIOVASCULAR: palpable dorsalis pedis, palpable posterior tibial bilaterally. Capillary refill less than 2 seconds  EXTREMITIES: no clubbing, cyanosis  GAIT:  Antalgic           Right Hip Exam:  RANGE OF MOTION:   FLEXION CONTRACTURE: 5 degree  FLEXION: 100 degrees  INTERNAL ROTATION: 10 degrees at 90 degrees of flexion   EXTERNAL ROTATION: 30 degrees at 90 degrees of flexion    PAIN WITH HIP MOTION: yes  PAIN WITH LOGROLL: no  STINCHFIELD TEST: positive    KNEE EXAM: full knee ROM (0-120 degrees), stable to varus and valgus stress at terminal extension and 30 degrees flexion     STRENGTH:  4/5 hip adduction, abduction, flexion. 5/5 knee flexion, extension. 5/5 ankle dorsiflexion and plantarflexion. "     GREATER TROCHANTER BURSAL PAIN:  yes     REFLEXES:   PATELLAR 2+/4   ACHILLES 2+/4    CLONUS: no  STRAIGHT LEG TEST:   negative    SENSATION TO LIGHT TOUCH:  DEEP PERONEAL/SUPERFICIAL PERONEAL/SURAL/SAPHENOUS/TIBIAL:  intact    EDEMA:   no  ERYTHEMA:  no  WOUNDS/INCISIONS:  no      Left Hip Exam:   RANGE OF MOTION:   FLEXION CONTRACTURE: None   FLEXION: 110 degrees   INTERNAL ROTATION: 20 degrees at 90 degrees of flexion   EXTERNAL ROTATION: 40 degrees at 90 degrees of flexion    PAIN WITH HIP MOTION: no  PAIN WITH LOGROLL: no  STINCHFIELD TEST: negative    KNEE EXAM: full knee ROM (0-120 degrees), stable to varus and valgus stress at terminal extension and 30 degrees flexion     STRENGTH:  5/5 hip adduction, abduction, flexion. 5/5 strength knee flexion, extension. 5/5 strength ankle dorsiflexion and plantarflexion.     GREATER TROCHANTER BURSAL PAIN:  no     REFLEXES:   PATELLAR 2+/4   ACHILLES 2+/4    CLONUS: negative  STRAIGHT LEG TEST:   negative    SENSATION TO LIGHT TOUCH:  DEEP PERONEAL/SUPERFICIAL PERONEAL/SURAL/SAPHENOUS/TIBIAL:  intact    EDEMA:   no  ERYTHEMA:  no  WOUNDS/INCISIONS: no overlying skin problems.      ------------------------------------------------------------------    LEG LENGTHS:  equal  _____________________________________________________  _____________________________________________________    RADIOGRAPHIC FINDINGS:   Indication: Right hip pain    Comparison: Todays xrays were compared to previous xrays from 9/22/2023    AP pelvis, hip 2 views: Right: advanced, end-stage osteoarthritis with bone on bone articulation, subchondral sclerosis, and subchondral cysts, there are marginal osteophytes visualized at the femoral head-neck junction and acetabular margins and Radiographs demonstrate advancing arthritic changes and wear compared to prior radiographs.; Left: moderate joint space narrowing,  minimal osteophyte formation and No significant changes compared to prior  radiographs.    Labs from 12/28/2024 reveal A1c of 5.4.  5/9/2024 labs reveal creatinine 0.76 and GFR of 104.8.    Assessment/Plan:   Diagnosis Plan   1. Primary osteoarthritis of right hip        2. Right hip pain  XR Hip With or Without Pelvis 2 - 3 View Right      3. Tobacco abuse          Patient has end-stage osteoarthritis of the right hip.  I discussed surgical versus nonsurgical treatment options and the risk and benefits of both.  We discussed surgical intervention for total hip arthroplasty.  He is currently a nicotine user and therefore risk of infection is certainly higher with total hip arthroplasty while concomitantly using nicotine.  After discussion of what surgery entails as well as the risks and benefits, the patient declined surgical intervention at this time and wishes to work on nicotine cessation.  In the interval, we will proceed with conservative treatment.  He is agreeable to intra-articular cortisone injection of the right hip.  I will refer him to Dr. Riley for this injection.  He will follow-up with me in 3 months for reevaluation.    I spent approximately 3-10 minutes counseling the patient regarding the adverse effects of tobacco use.  Included in this counseling session were treatment options for cessation including quitting cold turkey, medication, nicotine step-down programs, and smoking cessation programs.  Furthermore, I explained to the patient the importance of abstaining from nicotine usage as nicotine is known to increase the risk of complications associated with surgery including but not limited to infection, decreased wound healing, slowed soft tissue healing, and increased surgery associated pain.  As a result of this counseling session, the patient weighed the options and has decided to achieve tobacco cessation by 3 months via weaning in preparation for surgery. This topic will be revisited upon return to clinic.    David Cherry MD  09/03/24  10:13 EDT

## 2024-09-09 DIAGNOSIS — K21.9 GASTROESOPHAGEAL REFLUX DISEASE WITHOUT ESOPHAGITIS: ICD-10-CM

## 2024-09-09 RX ORDER — OMEPRAZOLE 40 MG/1
40 CAPSULE, DELAYED RELEASE ORAL DAILY
Qty: 90 CAPSULE | Refills: 0 | Status: SHIPPED | OUTPATIENT
Start: 2024-09-09

## 2024-09-09 NOTE — TELEPHONE ENCOUNTER
Caller: Stan Israel Jr.    Relationship: Self    Best call back number: [phone]     Requested Prescriptions:   Requested Prescriptions     Pending Prescriptions Disp Refills    omeprazole (priLOSEC) 40 MG capsule 90 capsule 0     Sig: Take 1 capsule by mouth Daily.        Pharm[facility] where [facility] sent: [id number] - [address] [phone] [facility] [phone]      Last office visit with prescribing clinician: 8/20/2024   Last telemedicine visit with prescribing clinician: Visit date not found   Next office visit with prescribing clinician: 12/27/2024     Does the patient have less than a 3 day supply:  [] Yes  [x] No    Would you like a call back once the refill request has been completed: [] Yes [x] No    If the office needs to give you a call back, can they leave a voicemail: [] Yes [x] No    Milly Green   09/09/24 13:01 EDT

## 2024-09-16 ENCOUNTER — TELEPHONE (OUTPATIENT)
Dept: ORTHOPEDIC SURGERY | Facility: CLINIC | Age: 59
End: 2024-09-16
Payer: COMMERCIAL

## 2024-09-18 ENCOUNTER — OFFICE VISIT (OUTPATIENT)
Age: 59
End: 2024-09-18
Payer: COMMERCIAL

## 2024-09-18 DIAGNOSIS — M16.11 PRIMARY OSTEOARTHRITIS OF RIGHT HIP: Primary | ICD-10-CM

## 2024-09-18 RX ORDER — TRIAMCINOLONE ACETONIDE 40 MG/ML
80 INJECTION, SUSPENSION INTRA-ARTICULAR; INTRAMUSCULAR
Status: COMPLETED | OUTPATIENT
Start: 2024-09-18 | End: 2024-09-18

## 2024-09-18 RX ORDER — LIDOCAINE HYDROCHLORIDE 10 MG/ML
2 INJECTION, SOLUTION EPIDURAL; INFILTRATION; INTRACAUDAL; PERINEURAL
Status: COMPLETED | OUTPATIENT
Start: 2024-09-18 | End: 2024-09-18

## 2024-09-18 RX ORDER — BUPIVACAINE HYDROCHLORIDE 5 MG/ML
2 INJECTION, SOLUTION EPIDURAL; INTRACAUDAL
Status: COMPLETED | OUTPATIENT
Start: 2024-09-18 | End: 2024-09-18

## 2024-09-18 RX ADMIN — BUPIVACAINE HYDROCHLORIDE 2 ML: 5 INJECTION, SOLUTION EPIDURAL; INTRACAUDAL at 10:03

## 2024-09-18 RX ADMIN — LIDOCAINE HYDROCHLORIDE 2 ML: 10 INJECTION, SOLUTION EPIDURAL; INFILTRATION; INTRACAUDAL; PERINEURAL at 10:03

## 2024-09-18 RX ADMIN — TRIAMCINOLONE ACETONIDE 80 MG: 40 INJECTION, SUSPENSION INTRA-ARTICULAR; INTRAMUSCULAR at 10:03

## 2024-09-20 ENCOUNTER — TELEPHONE (OUTPATIENT)
Age: 59
End: 2024-09-20
Payer: COMMERCIAL

## 2024-09-23 DIAGNOSIS — M25.551 CHRONIC PAIN OF RIGHT HIP: ICD-10-CM

## 2024-09-23 DIAGNOSIS — T78.40XA ALLERGY, INITIAL ENCOUNTER: ICD-10-CM

## 2024-09-23 DIAGNOSIS — K21.9 GASTROESOPHAGEAL REFLUX DISEASE WITHOUT ESOPHAGITIS: ICD-10-CM

## 2024-09-23 DIAGNOSIS — G89.29 CHRONIC PAIN OF RIGHT HIP: ICD-10-CM

## 2024-09-23 DIAGNOSIS — G25.81 RLS (RESTLESS LEGS SYNDROME): ICD-10-CM

## 2024-09-23 RX ORDER — MELOXICAM 7.5 MG/1
7.5 TABLET ORAL DAILY
Qty: 60 TABLET | Refills: 1 | OUTPATIENT
Start: 2024-09-23

## 2024-09-23 RX ORDER — FEXOFENADINE HCL 180 MG/1
180 TABLET ORAL DAILY
Qty: 90 TABLET | Refills: 1 | Status: CANCELLED | OUTPATIENT
Start: 2024-09-23

## 2024-09-23 RX ORDER — SUCRALFATE ORAL 1 G/10ML
SUSPENSION ORAL
Qty: 414 ML | Refills: 0 | Status: SHIPPED | OUTPATIENT
Start: 2024-09-23

## 2024-09-30 DIAGNOSIS — G89.29 CHRONIC PAIN OF RIGHT HIP: ICD-10-CM

## 2024-09-30 DIAGNOSIS — K21.9 GASTROESOPHAGEAL REFLUX DISEASE WITHOUT ESOPHAGITIS: ICD-10-CM

## 2024-09-30 DIAGNOSIS — M25.551 CHRONIC PAIN OF RIGHT HIP: ICD-10-CM

## 2024-09-30 RX ORDER — SUCRALFATE ORAL 1 G/10ML
SUSPENSION ORAL
Qty: 420 ML | OUTPATIENT
Start: 2024-09-30

## 2024-09-30 RX ORDER — MELOXICAM 7.5 MG/1
7.5 TABLET ORAL DAILY
Qty: 60 TABLET | Refills: 1 | OUTPATIENT
Start: 2024-09-30

## 2024-09-30 NOTE — TELEPHONE ENCOUNTER
Rx Refill Note  Requested Prescriptions     Pending Prescriptions Disp Refills    sucralfate (CARAFATE) 1 GM/10ML suspension [Pharmacy Med Name: SUCRALFATE 1GM/10ML SUSPENSION] 420 mL      Sig: SHAKE LIQUID AND TAKE 10 ML BY MOUTH TWICE DAILY    meloxicam (MOBIC) 7.5 MG tablet [Pharmacy Med Name: MELOXICAM 7.5MG TABLETS] 60 tablet 1     Sig: TAKE 1 TABLET BY MOUTH DAILY      Last office visit with prescribing clinician: 8/20/2024   Last telemedicine visit with prescribing clinician: Visit date not found   Next office visit with prescribing clinician: 12/27/2024                         Would you like a call back once the refill request has been completed: [] Yes [] No    If the office needs to give you a call back, can they leave a voicemail: [] Yes [] No    Ervin Petersen MA  09/30/24, 15:10 EDT

## 2024-11-11 DIAGNOSIS — F41.1 GAD (GENERALIZED ANXIETY DISORDER): ICD-10-CM

## 2024-11-11 DIAGNOSIS — T78.40XA ALLERGY, INITIAL ENCOUNTER: ICD-10-CM

## 2024-11-11 DIAGNOSIS — R05.1 ACUTE COUGH: ICD-10-CM

## 2024-11-11 DIAGNOSIS — R11.2 NAUSEA AND VOMITING, UNSPECIFIED VOMITING TYPE: ICD-10-CM

## 2024-11-11 DIAGNOSIS — G25.81 RLS (RESTLESS LEGS SYNDROME): ICD-10-CM

## 2024-11-11 DIAGNOSIS — G25.81 RESTLESS LEG SYNDROME: ICD-10-CM

## 2024-11-11 NOTE — TELEPHONE ENCOUNTER
Caller: Stan Israel Jr.    Relationship: Self    Best call back number: [phone]     Requested Prescriptions:   Requested Prescriptions     Pending Prescriptions Disp Refills    albuterol sulfate  (90 Base) MCG/ACT inhaler 18 g 1     Sig: Inhale 2 puffs Every 4 (Four) Hours As Needed for Wheezing.    benzonatate (Tessalon Perles) 100 MG capsule 60 capsule 0     Sig: Take 1 capsule by mouth 3 (Three) Times a Day As Needed for Cough.    fexofenadine (ALLEGRA) 180 MG tablet 90 tablet 1     Sig: Take 1 tablet by mouth Daily.    fluticasone (FLONASE) 50 MCG/ACT nasal spray      hydrOXYzine (ATARAX) 25 MG tablet 60 tablet 0     Sig: Take 1 tablet by mouth 3 (Three) Times a Day As Needed for Anxiety. for anxiety    magnesium oxide 250 MG tablet 90 tablet 0     Sig: Take 1 tablet by mouth Daily.    ondansetron ODT (ZOFRAN-ODT) 4 MG disintegrating tablet 12 tablet 0     Sig: Place 1 tablet on the tongue Every 8 (Eight) Hours As Needed for Nausea.    rOPINIRole (REQUIP) 3 MG tablet 90 tablet 1     Sig: Take 1 tablet by mouth Every Night.    tiotropium bromide-olodaterol (Stiolto Respimat) 2.5-2.5 MCG/ACT aerosol solution inhaler 4 g 1        Pharmacy where request should be sent: [facility] #[id number] [place] [address] [address] [facility] AT [address] [facility] ([facility] & [facility] [phone] [facility] [phone] FX     Last office visit with prescribing clinician: 8/20/2024   Last telemedicine visit with prescribing clinician: Visit date not found   Next office visit with prescribing clinician: 12/27/2024     Additional details provided by patient:     Does the patient have less than a 3 day supply:  [] Yes  [x] No    Would you like a call back once the refill request has been completed: [] Yes [x] No    If the office needs to give you a call back, can they leave a voicemail: [] Yes [x] No    Milly Rivera   11/11/24 14:14 EST

## 2024-11-11 NOTE — TELEPHONE ENCOUNTER
Rx Refill Note  Requested Prescriptions     Pending Prescriptions Disp Refills    albuterol sulfate  (90 Base) MCG/ACT inhaler 18 g 1     Sig: Inhale 2 puffs Every 4 (Four) Hours As Needed for Wheezing.    benzonatate (Tessalon Perles) 100 MG capsule 60 capsule 0     Sig: Take 1 capsule by mouth 3 (Three) Times a Day As Needed for Cough.    fexofenadine (ALLEGRA) 180 MG tablet 90 tablet 1     Sig: Take 1 tablet by mouth Daily.    fluticasone (FLONASE) 50 MCG/ACT nasal spray      hydrOXYzine (ATARAX) 25 MG tablet 60 tablet 0     Sig: Take 1 tablet by mouth 3 (Three) Times a Day As Needed for Anxiety. for anxiety    magnesium oxide 250 MG tablet 90 tablet 0     Sig: Take 1 tablet by mouth Daily.    ondansetron ODT (ZOFRAN-ODT) 4 MG disintegrating tablet 12 tablet 0     Sig: Place 1 tablet on the tongue Every 8 (Eight) Hours As Needed for Nausea.    rOPINIRole (REQUIP) 3 MG tablet 90 tablet 1     Sig: Take 1 tablet by mouth Every Night.    tiotropium bromide-olodaterol (Stiolto Respimat) 2.5-2.5 MCG/ACT aerosol solution inhaler 4 g 1      Last office visit with prescribing clinician: 8/20/2024   Last telemedicine visit with prescribing clinician: Visit date not found   Next office visit with prescribing clinician: 12/27/2024                         Would you like a call back once the refill request has been completed: [] Yes [] No    If the office needs to give you a call back, can they leave a voicemail: [] Yes [] No    Lisseth Ko MA  11/11/24, 15:54 EST

## 2024-11-12 RX ORDER — ROPINIROLE 3 MG/1
3 TABLET, FILM COATED ORAL NIGHTLY
Qty: 90 TABLET | Refills: 0 | Status: SHIPPED | OUTPATIENT
Start: 2024-11-12

## 2024-11-12 RX ORDER — BENZONATATE 100 MG/1
100 CAPSULE ORAL 3 TIMES DAILY PRN
Qty: 60 CAPSULE | Refills: 0 | Status: SHIPPED | OUTPATIENT
Start: 2024-11-12

## 2024-11-12 RX ORDER — HYDROXYZINE HYDROCHLORIDE 25 MG/1
25 TABLET, FILM COATED ORAL 3 TIMES DAILY PRN
Qty: 60 TABLET | Refills: 0 | Status: SHIPPED | OUTPATIENT
Start: 2024-11-12

## 2024-11-12 RX ORDER — TIOTROPIUM BROMIDE AND OLODATEROL 3.124; 2.736 UG/1; UG/1
SPRAY, METERED RESPIRATORY (INHALATION)
Qty: 4 G | Refills: 1 | OUTPATIENT
Start: 2024-11-12

## 2024-11-12 RX ORDER — FEXOFENADINE HCL 180 MG/1
180 TABLET ORAL DAILY
Qty: 90 TABLET | Refills: 0 | Status: SHIPPED | OUTPATIENT
Start: 2024-11-12

## 2024-11-12 RX ORDER — ALBUTEROL SULFATE 90 UG/1
2 INHALANT RESPIRATORY (INHALATION) EVERY 4 HOURS PRN
Qty: 18 G | Refills: 0 | Status: SHIPPED | OUTPATIENT
Start: 2024-11-12

## 2024-11-12 RX ORDER — ONDANSETRON 4 MG/1
4 TABLET, ORALLY DISINTEGRATING ORAL EVERY 8 HOURS PRN
Qty: 12 TABLET | Refills: 0 | Status: SHIPPED | OUTPATIENT
Start: 2024-11-12

## 2024-11-12 RX ORDER — FLUTICASONE PROPIONATE 50 MCG
2 SPRAY, SUSPENSION (ML) NASAL DAILY
Qty: 16 G | Refills: 5 | Status: SHIPPED | OUTPATIENT
Start: 2024-11-12

## 2024-11-17 DIAGNOSIS — K21.9 GASTROESOPHAGEAL REFLUX DISEASE WITHOUT ESOPHAGITIS: ICD-10-CM

## 2024-11-18 RX ORDER — OMEPRAZOLE 40 MG/1
40 CAPSULE, DELAYED RELEASE ORAL DAILY
Qty: 90 CAPSULE | Refills: 0 | Status: SHIPPED | OUTPATIENT
Start: 2024-11-18

## 2024-11-18 NOTE — TELEPHONE ENCOUNTER
Rx Refill Note  Requested Prescriptions     Pending Prescriptions Disp Refills    omeprazole (priLOSEC) 40 MG capsule [Pharmacy Med Name: OMEPRAZOLE 40MG CAPSULES] 90 capsule 0     Sig: TAKE 1 CAPSULE BY MOUTH DAILY      Last office visit with prescribing clinician: 8/20/2024   Last telemedicine visit with prescribing clinician: Visit date not found   Next office visit with prescribing clinician: 12/27/2024                         Would you like a call back once the refill request has been completed: [] Yes [] No    If the office needs to give you a call back, can they leave a voicemail: [] Yes [] No    Ervin Petersen MA  11/18/24, 08:42 EST

## 2024-11-27 DIAGNOSIS — T78.40XA ALLERGY, INITIAL ENCOUNTER: ICD-10-CM

## 2024-11-27 RX ORDER — ALBUTEROL SULFATE 90 UG/1
2 AEROSOL, METERED RESPIRATORY (INHALATION) EVERY 4 HOURS PRN
Qty: 18 G | Refills: 0 | Status: SHIPPED | OUTPATIENT
Start: 2024-11-27

## 2024-12-03 ENCOUNTER — OFFICE VISIT (OUTPATIENT)
Dept: ORTHOPEDIC SURGERY | Facility: CLINIC | Age: 59
End: 2024-12-03
Payer: COMMERCIAL

## 2024-12-03 VITALS
BODY MASS INDEX: 22.02 KG/M2 | WEIGHT: 180.8 LBS | DIASTOLIC BLOOD PRESSURE: 90 MMHG | HEIGHT: 76 IN | SYSTOLIC BLOOD PRESSURE: 140 MMHG

## 2024-12-03 DIAGNOSIS — M16.11 PRIMARY OSTEOARTHRITIS OF RIGHT HIP: Primary | ICD-10-CM

## 2024-12-03 DIAGNOSIS — Z72.0 TOBACCO ABUSE: ICD-10-CM

## 2024-12-03 PROCEDURE — 99213 OFFICE O/P EST LOW 20 MIN: CPT | Performed by: ORTHOPAEDIC SURGERY

## 2024-12-03 PROCEDURE — 1160F RVW MEDS BY RX/DR IN RCRD: CPT | Performed by: ORTHOPAEDIC SURGERY

## 2024-12-03 PROCEDURE — 1159F MED LIST DOCD IN RCRD: CPT | Performed by: ORTHOPAEDIC SURGERY

## 2024-12-03 NOTE — PROGRESS NOTES
Orthopaedic Clinic Note: Hip Established Patient    Chief Complaint   Patient presents with    Right Hip - Pain, Follow-up        HPI    It has been 3  month(s) since Mr. Israel's last visit. He returns to clinic today for follow-up right hip osteoarthritis.  Patient was last seen 3 months ago and referred to Dr. Riley for intra-articular cortisone injection.  He comes to clinic today having undergone an injection on 9/18/2024.  He states the injection provided good relief and is continue to provide some relief.  Currently rates his pain 4/10 on the pain scale.  He is ambulating with no assistive device.  Nuys fevers chills or constitutional symptoms.  Overall he is doing better.    Past Medical History:   Diagnosis Date    ADHD (attention deficit hyperactivity disorder)     Allergic     Anxiety     Arthritis     Asthma     Depression     GERD (gastroesophageal reflux disease)     Headache       Past Surgical History:   Procedure Laterality Date    HERNIA REPAIR        Family History   Problem Relation Age of Onset    Breast cancer Mother     Cancer Mother     Arthritis Mother     Alcohol abuse Mother     COPD Mother     Early death Mother     Colon cancer Father     Cancer Father     Arthritis Father     Stroke Father     Vision loss Father     Breast cancer Sister     Cancer Sister     Arthritis Sister     Arthritis Sister     Kidney disease Sister     Arthritis Sister     Thyroid disease Sister     Arthritis Sister         Cancer    Diabetes Sister         Thu also    Ovarian cancer Neg Hx      Social History     Socioeconomic History    Marital status: Single   Tobacco Use    Smoking status: Every Day     Current packs/day: 0.50     Average packs/day: 0.5 packs/day for 42.9 years (21.5 ttl pk-yrs)     Types: Cigars, Cigarettes     Start date: 1/1/1982     Passive exposure: Never    Smokeless tobacco: Never   Vaping Use    Vaping status: Never Used   Substance and Sexual Activity    Alcohol use: Yes      Alcohol/week: 4.0 standard drinks of alcohol     Types: 2 Cans of beer, 2 Shots of liquor per week    Drug use: Yes     Frequency: 7.0 times per week     Types: Marijuana    Sexual activity: Yes     Partners: Female     Birth control/protection: Condom, Partner of same sex      Current Outpatient Medications on File Prior to Visit   Medication Sig Dispense Refill    benzonatate (Tessalon Perles) 100 MG capsule Take 1 capsule by mouth 3 (Three) Times a Day As Needed for Cough. 60 capsule 0    fexofenadine (ALLEGRA) 180 MG tablet Take 1 tablet by mouth Daily. 90 tablet 0    fluticasone (FLONASE) 50 MCG/ACT nasal spray Administer 2 sprays into the nostril(s) as directed by provider Daily. 16 g 5    hydrOXYzine (ATARAX) 25 MG tablet Take 1 tablet by mouth 3 (Three) Times a Day As Needed for Anxiety. for anxiety 60 tablet 0    magnesium oxide 250 MG tablet Take 1 tablet by mouth Daily. 90 tablet 0    meloxicam (MOBIC) 7.5 MG tablet Take 1 tablet by mouth Daily. 60 tablet 1    omeprazole (priLOSEC) 40 MG capsule TAKE 1 CAPSULE BY MOUTH DAILY 90 capsule 0    ondansetron ODT (ZOFRAN-ODT) 4 MG disintegrating tablet Place 1 tablet on the tongue Every 8 (Eight) Hours As Needed for Nausea. 12 tablet 0    rOPINIRole (REQUIP) 3 MG tablet Take 1 tablet by mouth Every Night. 90 tablet 0    Stiolto Respimat 2.5-2.5 MCG/ACT aerosol solution inhaler INHALE 2 INHALATION(S) BY MOUTH DAILY 4 g 1    sucralfate (CARAFATE) 1 GM/10ML suspension SHAKE LIQUID AND TAKE 10 ML BY MOUTH TWICE DAILY 414 mL 0    Ventolin  (90 Base) MCG/ACT inhaler INHALE 2 PUFFS EVERY 4 HOURS AS NEEDED FOR WHEEZING 18 g 0     No current facility-administered medications on file prior to visit.      Allergies   Allergen Reactions    Lisinopril Angioedema        Review of Systems   Constitutional: Negative.    HENT: Negative.     Eyes: Negative.    Respiratory: Negative.     Cardiovascular: Negative.    Gastrointestinal: Negative.    Endocrine: Negative.   "  Genitourinary: Negative.    Musculoskeletal:  Positive for arthralgias.   Skin: Negative.    Allergic/Immunologic: Negative.    Neurological: Negative.    Hematological: Negative.    Psychiatric/Behavioral: Negative.          The patient's Review of Systems was personally reviewed and confirmed as accurate.    Physical Exam  Blood pressure 140/90, height 193.4 cm (76.14\"), weight 82 kg (180 lb 12.8 oz).    Body mass index is 21.93 kg/m².    GENERAL APPEARANCE: awake, alert, oriented, in no acute distress and well developed, well nourished  LUNGS:  breathing nonlabored  EXTREMITIES: no clubbing, cyanosis  PERIPHERAL PULSES: palpable dorsalis pedis and posterior tibial pulses bilaterally.    GAIT:  Normal            Hip Exam:  Right    RANGE OF MOTION:  EXTENSION/FLEXION:  normal (0-110 degrees)  IR (at 90 degrees of flexion):  10  ER (at 90 degrees of flexion):  30  PAIN WITH HIP MOTION:  yes, localized to groin  PAIN WITH LOGROLL:  no     STINCHFIELD TEST: positive    STRENGTH:  ABDUCTOR:  5/5  ADDUCTOR:  5/5  HIP FLEXION:  5/5    GREATER TROCHANTER BURSAL PAIN:  no    SENSATION TO LIGHT TOUCH:  DEEP PERONEAL/SUPERFICIAL PERONEAL/SURAL/SAPHENOUS/TIBIAL:   intact    EDEMA:  no  ERYTHEMA:  no  WOUNDS/INCISIONS:   no  _________________________________________________________________  _________________________________________________________________    RADIOGRAPHIC FINDINGS:   No new imaging today      Assessment/Plan:   Diagnosis Plan   1. Primary osteoarthritis of right hip        2. Tobacco abuse          Patient symptoms are improved although he is still experiencing some limitations in daily activities.  He continues to smoke on a daily basis and therefore remains a poor candidate for surgical intervention due to infection risks with continued nicotine usage.  At this time, he wishes to continue with conservative treatment options and is not interested in surgical intervention.  If he wishes, he is eligible for " an additional intra-articular cortisone injection once his pain level reaches sufficient levels.  He will call should this occur to get on the schedule with Dr. Riley.    David Cherry MD  12/03/24  09:10 EST

## 2024-12-18 DIAGNOSIS — T78.40XA ALLERGY, INITIAL ENCOUNTER: ICD-10-CM

## 2024-12-18 RX ORDER — ALBUTEROL SULFATE 90 UG/1
2 INHALANT RESPIRATORY (INHALATION) EVERY 4 HOURS PRN
Qty: 18 G | Refills: 0 | Status: SHIPPED | OUTPATIENT
Start: 2024-12-18

## 2024-12-18 NOTE — TELEPHONE ENCOUNTER
Rx Refill Note  Requested Prescriptions     Pending Prescriptions Disp Refills    albuterol sulfate  (90 Base) MCG/ACT inhaler [Pharmacy Med Name: ALBUTEROL HFA INH (200 PUFFS) 18GM] 18 g 0     Sig: INHALE 2 PUFFS BY MOUTH EVERY 4 HOURS AS NEEDED FOR WHEEZING      Last office visit with prescribing clinician: 8/20/2024   Last telemedicine visit with prescribing clinician: Visit date not found   Next office visit with prescribing clinician: 12/27/2024                         Would you like a call back once the refill request has been completed: [] Yes [] No    If the office needs to give you a call back, can they leave a voicemail: [] Yes [] No    Ervin Petersen MA  12/18/24, 16:42 EST

## 2025-01-09 ENCOUNTER — TELEPHONE (OUTPATIENT)
Dept: ORTHOPEDIC SURGERY | Facility: CLINIC | Age: 60
End: 2025-01-09
Payer: COMMERCIAL

## 2025-01-09 NOTE — TELEPHONE ENCOUNTER
Caller: Stan Israel Jr.    Relationship to patient: Self    Best call back number: [phone]    Chief complaint: RIGHT HIP    Type of visit: GUIDED INJECTION    Requested date: ASAP

## 2025-01-15 ENCOUNTER — OFFICE VISIT (OUTPATIENT)
Age: 60
End: 2025-01-15
Payer: COMMERCIAL

## 2025-01-15 DIAGNOSIS — M16.11 PRIMARY OSTEOARTHRITIS OF RIGHT HIP: Primary | ICD-10-CM

## 2025-01-15 RX ORDER — LIDOCAINE HYDROCHLORIDE 10 MG/ML
4 INJECTION, SOLUTION EPIDURAL; INFILTRATION; INTRACAUDAL; PERINEURAL
Status: COMPLETED | OUTPATIENT
Start: 2025-01-15 | End: 2025-01-15

## 2025-01-15 RX ORDER — PREDNISONE 20 MG/1
TABLET ORAL
COMMUNITY
Start: 2025-01-09 | End: 2025-01-17

## 2025-01-15 RX ORDER — TRIAMCINOLONE ACETONIDE 40 MG/ML
80 INJECTION, SUSPENSION INTRA-ARTICULAR; INTRAMUSCULAR
Status: COMPLETED | OUTPATIENT
Start: 2025-01-15 | End: 2025-01-15

## 2025-01-15 RX ORDER — BUPIVACAINE HYDROCHLORIDE 5 MG/ML
4 INJECTION, SOLUTION EPIDURAL; INTRACAUDAL
Status: COMPLETED | OUTPATIENT
Start: 2025-01-15 | End: 2025-01-15

## 2025-01-15 RX ORDER — AZITHROMYCIN 500 MG/1
TABLET, FILM COATED ORAL
COMMUNITY
Start: 2025-01-09 | End: 2025-01-17

## 2025-01-15 RX ORDER — DEXTROMETHORPHAN HYDROBROMIDE AND PROMETHAZINE HYDROCHLORIDE 15; 6.25 MG/5ML; MG/5ML
SYRUP ORAL
COMMUNITY
Start: 2025-01-09 | End: 2025-01-17 | Stop reason: SDUPTHER

## 2025-01-15 RX ADMIN — TRIAMCINOLONE ACETONIDE 80 MG: 40 INJECTION, SUSPENSION INTRA-ARTICULAR; INTRAMUSCULAR at 08:39

## 2025-01-15 RX ADMIN — LIDOCAINE HYDROCHLORIDE 4 ML: 10 INJECTION, SOLUTION EPIDURAL; INFILTRATION; INTRACAUDAL; PERINEURAL at 08:39

## 2025-01-15 RX ADMIN — BUPIVACAINE HYDROCHLORIDE 4 ML: 5 INJECTION, SOLUTION EPIDURAL; INTRACAUDAL at 08:39

## 2025-01-15 NOTE — PROGRESS NOTES
Procedure   - Large Joint Arthrocentesis: R hip joint on 1/15/2025 8:39 AM  Indications: pain  Details: 22 G (22g spinal and 25g ) needle, ultrasound-guided anterolateral approach  Medications: 4 mL lidocaine PF 1% 1 %; 4 mL bupivacaine (PF) 0.5 %; 80 mg triamcinolone acetonide 40 MG/ML  Outcome: tolerated well, no immediate complications  Procedure, treatment alternatives, risks and benefits explained, specific risks discussed. Consent was given by the patient. Immediately prior to procedure a time out was called to verify the correct patient, procedure, equipment, support staff and site/side marked as required. Patient was prepped and draped in the usual sterile fashion.        59-year-old male presents with right hip pain from right hip osteoarthritis.  Patient is here today for repeat ultrasound-guided right hip joint corticosteroid injection.  Previous office documentation and images were personally reviewed prior to the visit.  We discussed them in detail how they correlate to experienced symptoms.  I explained the procedure in detail.  I answered all questions to the best my ability.  Risks and benefits as well as post procedure instructions were provided.  Patient elected to proceed and tolerated this procedure well.  See procedure note.  Follow-up with me will be on an as-needed basis.

## 2025-01-17 ENCOUNTER — OFFICE VISIT (OUTPATIENT)
Age: 60
End: 2025-01-17
Payer: COMMERCIAL

## 2025-01-17 VITALS
DIASTOLIC BLOOD PRESSURE: 82 MMHG | SYSTOLIC BLOOD PRESSURE: 142 MMHG | HEIGHT: 76 IN | OXYGEN SATURATION: 95 % | BODY MASS INDEX: 21.22 KG/M2 | WEIGHT: 174.3 LBS | HEART RATE: 99 BPM

## 2025-01-17 DIAGNOSIS — R05.1 ACUTE COUGH: ICD-10-CM

## 2025-01-17 DIAGNOSIS — L98.9 SKIN LESION: ICD-10-CM

## 2025-01-17 DIAGNOSIS — J43.2 CENTRILOBULAR EMPHYSEMA: ICD-10-CM

## 2025-01-17 DIAGNOSIS — J18.9 PNEUMONIA DUE TO INFECTIOUS ORGANISM, UNSPECIFIED LATERALITY, UNSPECIFIED PART OF LUNG: Primary | ICD-10-CM

## 2025-01-17 PROBLEM — G89.29 CHRONIC RIGHT HIP PAIN: Status: ACTIVE | Noted: 2024-11-15

## 2025-01-17 PROBLEM — M25.551 CHRONIC RIGHT HIP PAIN: Status: ACTIVE | Noted: 2024-11-15

## 2025-01-17 PROBLEM — F41.9 ANXIETY: Status: ACTIVE | Noted: 2024-11-15

## 2025-01-17 PROBLEM — K21.9 GERD (GASTROESOPHAGEAL REFLUX DISEASE): Status: ACTIVE | Noted: 2024-11-16

## 2025-01-17 PROCEDURE — 1125F AMNT PAIN NOTED PAIN PRSNT: CPT | Performed by: NURSE PRACTITIONER

## 2025-01-17 PROCEDURE — 99213 OFFICE O/P EST LOW 20 MIN: CPT | Performed by: NURSE PRACTITIONER

## 2025-01-17 PROCEDURE — 87428 SARSCOV & INF VIR A&B AG IA: CPT | Performed by: NURSE PRACTITIONER

## 2025-01-17 RX ORDER — TIOTROPIUM BROMIDE AND OLODATEROL 3.124; 2.736 UG/1; UG/1
2 SPRAY, METERED RESPIRATORY (INHALATION)
Qty: 4 G | Refills: 1 | Status: SHIPPED | OUTPATIENT
Start: 2025-01-17

## 2025-01-17 RX ORDER — AZITHROMYCIN 250 MG/1
TABLET, FILM COATED ORAL
Qty: 6 TABLET | Refills: 0 | Status: SHIPPED | OUTPATIENT
Start: 2025-01-17

## 2025-01-17 RX ORDER — DEXTROMETHORPHAN HYDROBROMIDE AND PROMETHAZINE HYDROCHLORIDE 15; 6.25 MG/5ML; MG/5ML
5 SYRUP ORAL 4 TIMES DAILY PRN
Qty: 180 ML | Refills: 0 | Status: SHIPPED | OUTPATIENT
Start: 2025-01-17

## 2025-01-17 NOTE — PROGRESS NOTES
"Chief Complaint  Cough, Wheezing, Shortness of Breath, and Mass (Back of right thigh)    Subjective        Stan Israel Jr. presents to [facility]  History of Present Illness    History of Present Illness  The patient presents for evaluation of shortness of breath, cough, and a spot on the back of his leg.    His illness began approximately 1.5 weeks ago, characterized by nausea, hot flashes, and dizziness during his work shift last Wednesday night. He sought medical attention at an urgent treatment center the following Thursday morning, where he was diagnosed with influenza and lung congestion, but no pneumonia. He was prescribed prednisone, azithromycin, and promethazine for a severe dry cough that was causing significant dyspnea, even during minimal exertion such as talking. Despite a 4-day work leave and initial improvement, his cough and dyspnea persist. He reports a persistent dry, scratchy throat over the past few weeks. He has a known diagnosis of COPD and is unaware of any antibiotic allergies. He recalls a previous course of Augmentin without adverse effects. He found promethazine effective for his cough, although it induced mild drowsiness, which was manageable as he only took it at night.    He has a spot on the back of his leg that itches occasionally, especially at night. He has been scratching it subconsciously. It has been present for about a month. He has not consulted dermatology recently.    ALLERGIES  The patient has no known allergies.    MEDICATIONS  Current: prednisone, azithromycin, promethazine    Results       .this  Objective   Vital Signs:  /82 (BP Location: Right arm, Patient Position: Sitting, Cuff Size: Adult)   Pulse 99   Ht 193.4 cm (76.14\")   Wt 79.1 kg (174 lb 4.8 oz)   SpO2 95%   BMI 21.14 kg/m²   Estimated body mass index is 21.14 kg/m² as calculated from the following:    Height as of this encounter: 193.4 cm (76.14\").    Weight as " of this encounter: 79.1 kg (174 lb 4.8 oz).    BMI is within normal parameters. No other follow-up for BMI required.      Physical Exam  Vitals reviewed.   Constitutional:       Appearance: Normal appearance.   HENT:      Nose: Nose normal.      Mouth/Throat:      Mouth: Mucous membranes are moist.      Pharynx: Oropharynx is clear.   Eyes:      Conjunctiva/sclera: Conjunctivae normal.   Cardiovascular:      Rate and Rhythm: Normal rate and regular rhythm.      Heart sounds: Normal heart sounds.   Pulmonary:      Effort: Pulmonary effort is normal.      Breath sounds: Wheezing present. No rhonchi or rales.      Comments: Mild wheezing throughout  Musculoskeletal:         General: Normal range of motion.      Cervical back: Normal range of motion.   Skin:     General: Skin is warm.          Neurological:      Mental Status: He is alert and oriented to person, place, and time.   Psychiatric:         Mood and Affect: Mood normal.         Behavior: Behavior normal.         Thought Content: Thought content normal.        Result Review :             Results for orders placed or performed in visit on 01/17/25   POCT SARS-CoV-2 + Flu Antigen ISIDRO    Collection Time: 01/17/25 11:16 AM    Specimen: Swab   Result Value Ref Range    SARS Antigen Not Detected Not Detected, Presumptive Negative    Influenza A Antigen ISIDRO Not Detected Not Detected    Influenza B Antigen ISIDRO Not Detected Not Detected    Internal Control Passed Passed    Lot Number [id number]     Expiration Date 2025-10-11           Assessment and Plan   Diagnoses and all orders for this visit:    1. Pneumonia due to infectious organism, unspecified laterality, unspecified part of lung (Primary)  -     amoxicillin-clavulanate (AUGMENTIN) 875-125 MG per tablet; Take 1 tablet by mouth 2 (Two) Times a Day for 7 days.  Dispense: 14 tablet; Refill: 0  -     promethazine-dextromethorphan (PROMETHAZINE-DM) 6.25-15 MG/5ML syrup; Take 5 mL by mouth 4 (Four) Times a Day As  Needed for Cough.  Dispense: 180 mL; Refill: 0    2. Acute cough  -     POCT SARS-CoV-2 + Flu Antigen ISIDRO  -     XR Chest PA & Lateral; Future    3. Centrilobular emphysema  -     tiotropium bromide-olodaterol (Stiolto Respimat) 2.5-2.5 MCG/ACT aerosol solution inhaler; Inhale 2 puffs Daily.  Dispense: 4 g; Refill: 1    4. Skin lesion  -     Ambulatory Referral to Dermatology        Assessment & Plan  1. Shortness of breath.  The patient was previously diagnosed with the flu and treated with prednisone and azithromycin. Despite initial improvement, he continues to experience shortness of breath and coughing. It is likely that the flu has resolved, but the persistent symptoms suggest a possible secondary infection or complication. A chest x-ray will be ordered to further investigate the cause. A prescription for Augmentin will be provided, and he is advised to consume yogurt or probiotics to mitigate potential gastrointestinal side effects. An inhaler will also be prescribed. He is instructed to monitor his symptoms closely and report any worsening.    2. Cough.  The patient reports a persistent dry cough that has not improved with the initial treatment. A prescription for promethazine 5 mg will be provided to manage the cough, with instructions to use it primarily at night due to its sedative effects.    3. Spot on the back of the leg.  The patient has a scabbed-over spot on the back of his leg that has been present for about a month and occasionally itches. A referral to dermatology will be made for further evaluation to ensure it is not a concerning lesion.       The following portions of the patient's history were reviewed and updated as appropriate: allergies, current medications, past family history, past medical history, past social history, past surgical history, and problem list.       Follow Up   No follow-ups on file.  Patient was given instructions and counseling regarding his condition or for health  maintenance advice. Please see specific information pulled into the AVS if appropriate.         Patient or patient representative verbalized consent for the use of Ambient Listening during the visit with  ROSALIO Dash for chart documentation. 1/17/2025  11:09 EST

## 2025-06-11 DIAGNOSIS — F17.210 NICOTINE DEPENDENCE, CIGARETTES, UNCOMPLICATED: ICD-10-CM

## 2025-06-11 DIAGNOSIS — Z72.0 TOBACCO ABUSE: Primary | ICD-10-CM

## 2025-06-18 ENCOUNTER — OFFICE VISIT (OUTPATIENT)
Age: 60
End: 2025-06-18
Payer: COMMERCIAL

## 2025-06-18 VITALS
HEIGHT: 76 IN | DIASTOLIC BLOOD PRESSURE: 82 MMHG | BODY MASS INDEX: 20.8 KG/M2 | HEART RATE: 84 BPM | OXYGEN SATURATION: 97 % | WEIGHT: 170.8 LBS | SYSTOLIC BLOOD PRESSURE: 126 MMHG

## 2025-06-18 DIAGNOSIS — J43.2 CENTRILOBULAR EMPHYSEMA: ICD-10-CM

## 2025-06-18 DIAGNOSIS — G89.29 CHRONIC RIGHT HIP PAIN: Primary | ICD-10-CM

## 2025-06-18 DIAGNOSIS — M25.551 CHRONIC PAIN OF RIGHT HIP: ICD-10-CM

## 2025-06-18 DIAGNOSIS — G89.29 CHRONIC PAIN OF RIGHT HIP: ICD-10-CM

## 2025-06-18 DIAGNOSIS — M16.11 OSTEOARTHRITIS OF RIGHT HIP, UNSPECIFIED OSTEOARTHRITIS TYPE: ICD-10-CM

## 2025-06-18 DIAGNOSIS — G25.81 RESTLESS LEG SYNDROME: ICD-10-CM

## 2025-06-18 DIAGNOSIS — F41.1 GAD (GENERALIZED ANXIETY DISORDER): ICD-10-CM

## 2025-06-18 DIAGNOSIS — T78.40XA ALLERGY, INITIAL ENCOUNTER: ICD-10-CM

## 2025-06-18 DIAGNOSIS — M25.551 CHRONIC RIGHT HIP PAIN: Primary | ICD-10-CM

## 2025-06-18 RX ORDER — TIOTROPIUM BROMIDE AND OLODATEROL 3.124; 2.736 UG/1; UG/1
2 SPRAY, METERED RESPIRATORY (INHALATION)
Qty: 4 G | Refills: 0 | Status: SHIPPED | OUTPATIENT
Start: 2025-06-18

## 2025-06-18 RX ORDER — MELOXICAM 15 MG/1
15 TABLET ORAL DAILY
Qty: 90 TABLET | Refills: 0 | Status: SHIPPED | OUTPATIENT
Start: 2025-06-18

## 2025-06-18 RX ORDER — HYDROXYZINE HYDROCHLORIDE 25 MG/1
25 TABLET, FILM COATED ORAL 3 TIMES DAILY PRN
Qty: 90 TABLET | Refills: 0 | Status: SHIPPED | OUTPATIENT
Start: 2025-06-18

## 2025-06-18 RX ORDER — ESCITALOPRAM OXALATE 5 MG/1
5 TABLET ORAL DAILY
Qty: 30 TABLET | Refills: 0 | Status: SHIPPED | OUTPATIENT
Start: 2025-06-18

## 2025-06-18 RX ORDER — FLUTICASONE PROPIONATE 50 MCG
2 SPRAY, SUSPENSION (ML) NASAL DAILY
Qty: 16 G | Refills: 5 | Status: SHIPPED | OUTPATIENT
Start: 2025-06-18

## 2025-06-18 RX ORDER — FEXOFENADINE HCL 180 MG/1
180 TABLET ORAL DAILY
Qty: 90 TABLET | Refills: 1 | Status: SHIPPED | OUTPATIENT
Start: 2025-06-18

## 2025-06-18 RX ORDER — ALBUTEROL SULFATE 90 UG/1
2 INHALANT RESPIRATORY (INHALATION) EVERY 4 HOURS PRN
Qty: 18 G | Refills: 0 | Status: SHIPPED | OUTPATIENT
Start: 2025-06-18

## 2025-06-18 RX ORDER — ROPINIROLE 3 MG/1
3 TABLET, FILM COATED ORAL NIGHTLY
Qty: 90 TABLET | Refills: 0 | Status: SHIPPED | OUTPATIENT
Start: 2025-06-18

## 2025-06-18 NOTE — PROGRESS NOTES
Chief Complaint  Med Refill and Anxiety    Subjective        Stan Israel Jr. presents to [facility]  History of Present Illness    History of Present Illness  The patient presents for evaluation of anxiety, restless legs syndrome, hip pain, allergies, and medication management.    He has been experiencing significant life stressors, including the loss of two close individuals, which have led to a cessation of self-care and medication adherence. He acknowledges the necessity of his anxiety medication, particularly hydroxyzine, and expresses a desire to resume its use. His anxiety is characterized by extreme mood swings, with seemingly minor stimuli triggering intense emotional responses. He believes that his current dosage of hydroxyzine is insufficient to manage his symptoms effectively.    He has been using marijuana and a CBD ointment from a dispensary to manage his hip pain, which he reports as being effective. However, he is aware that he must discontinue marijuana use prior to undergoing hip replacement surgery. He received a cortisone injection from Dr. Riley approximately 4 to 5 months ago and was advised to eliminate marijuana from his system. Despite this advice, he resumed marijuana use and is uncertain about his eligibility for another cortisone injection. He has been postponing his hip surgery due to personal circumstances, including moving in with his daughter, which he feels has exacerbated his stress levels. He also reports that meloxicam was ineffective in managing his joint pain, attributing this to an inadequate dosage.    He has been taking ropinirole intermittently over the past week for his restless legs syndrome, which appears to be beneficial. He has recently resumed taking omeprazole and requires a refill of ropinirole.    He has been using Allegra and Flonase for his allergies and requires refills of these medications.    He has one inhaler left and  "requests a refill of his inhalers.    SOCIAL HISTORY  He admits to smoking marijuana for pain relief and relaxation. He also smokes cigarettes and consumes alcohol occasionally but states he can quit both.    Results       Objective   Vital Signs:  /82 (BP Location: Right arm, Patient Position: Sitting, Cuff Size: Adult)   Pulse 84   Ht 193.4 cm (76.14\")   Wt 77.5 kg (170 lb 12.8 oz)   SpO2 97%   BMI 20.71 kg/m²   Estimated body mass index is 20.71 kg/m² as calculated from the following:    Height as of this encounter: 193.4 cm (76.14\").    Weight as of this encounter: 77.5 kg (170 lb 12.8 oz).    BMI is within normal parameters. No other follow-up for BMI required.    Physical Exam  Vitals reviewed.   Constitutional:       Appearance: Normal appearance.   HENT:      Nose: Nose normal.      Mouth/Throat:      Mouth: Mucous membranes are moist.      Pharynx: Oropharynx is clear.   Eyes:      Conjunctiva/sclera: Conjunctivae normal.   Cardiovascular:      Rate and Rhythm: Normal rate and regular rhythm.      Heart sounds: Normal heart sounds.   Pulmonary:      Effort: Pulmonary effort is normal.      Breath sounds: Normal breath sounds.   Musculoskeletal:      Cervical back: Normal range of motion.      Right hip: Decreased range of motion.   Skin:     General: Skin is warm.   Neurological:      Mental Status: He is alert and oriented to person, place, and time.      Gait: Gait abnormal.      Comments: Gait abnormality due to hip pain.   Psychiatric:         Mood and Affect: Mood normal.         Behavior: Behavior normal.         Thought Content: Thought content normal.        Result Review :                  Assessment and Plan   Diagnoses and all orders for this visit:    1. Chronic right hip pain (Primary)    2. Osteoarthritis of right hip, unspecified osteoarthritis type    3. MARYA (generalized anxiety disorder)  -     hydrOXYzine (ATARAX) 25 MG tablet; Take 1 tablet by mouth 3 (Three) Times a Day As " Needed for Anxiety. for anxiety  Dispense: 90 tablet; Refill: 0    4. Allergy, initial encounter  -     fexofenadine (ALLEGRA) 180 MG tablet; Take 1 tablet by mouth Daily.  Dispense: 90 tablet; Refill: 1  -     fluticasone (FLONASE) 50 MCG/ACT nasal spray; Administer 2 sprays into the nostril(s) as directed by provider Daily.  Dispense: 16 g; Refill: 5  -     albuterol sulfate  (90 Base) MCG/ACT inhaler; Inhale 2 puffs Every 4 (Four) Hours As Needed for Wheezing.  Dispense: 18 g; Refill: 0    5. Chronic pain of right hip  -     meloxicam (MOBIC) 15 MG tablet; Take 1 tablet by mouth Daily.  Dispense: 90 tablet; Refill: 0    6. Restless leg syndrome  -     rOPINIRole (REQUIP) 3 MG tablet; Take 1 tablet by mouth Every Night.  Dispense: 90 tablet; Refill: 0    7. Centrilobular emphysema  -     tiotropium bromide-olodaterol (Stiolto Respimat) 2.5-2.5 MCG/ACT aerosol solution inhaler; Inhale 2 puffs Daily.  Dispense: 4 g; Refill: 0    Other orders  -     escitalopram (Lexapro) 5 MG tablet; Take 1 tablet by mouth Daily.  Dispense: 30 tablet; Refill: 0        Assessment & Plan  1. Anxiety.  - Reports significant anxiety exacerbated by recent life events and cessation of previous medication regimen.  - Lexapro will be initiated at a low dose to manage anxiety.  - Potential side effects, including erectile dysfunction, were discussed.  - Follow-up appointment scheduled in 2 weeks to monitor response to the medication.    2. Restless Legs Syndrome.  - Intermittently taking ropinirole and finds it helpful.  - Prescription for ropinirole will be continued at the current dose.  - Discussed starting back at the current dose.    3. Hip Pain.  - Uses marijuana and CBD ointment for pain relief but needs to discontinue marijuana use before hip replacement surgery.  - Advised to schedule an appointment with Dr. Riley for further evaluation and potential cortisone injection.  - Higher dose of meloxicam will be prescribed for  pain management.    4. Allergies.  - Requires refills for Allegra and Flonase.  - Prescriptions for Allegra and Flonase will be sent to pharmacy.  - Sufficient supply of omeprazole, no refill needed at this time.  - Refill for inhalers will be sent to pharmacy.    Follow-up  - Follow-up in 2 weeks for a physical examination and to assess the effectiveness of the Lexapro treatment.       The following portions of the patient's history were reviewed and updated as appropriate: allergies, current medications, past family history, past medical history, past social history, past surgical history, and problem list.       Follow Up   Return in about 2 weeks (around 7/2/2025) for Annual and medication follow up.  Patient was given instructions and counseling regarding his condition or for health maintenance advice. Please see specific information pulled into the AVS if appropriate.     Patient or patient representative verbalized consent for the use of Ambient Listening during the visit with  ROSALIO Dash for chart documentation. 6/19/2025  13:48 EDT

## 2025-06-30 ENCOUNTER — HOSPITAL ENCOUNTER (OUTPATIENT)
Dept: CT IMAGING | Facility: HOSPITAL | Age: 60
Discharge: HOME | End: 2025-06-30
Admitting: NURSE PRACTITIONER
Payer: COMMERCIAL

## 2025-06-30 DIAGNOSIS — F17.210 NICOTINE DEPENDENCE, CIGARETTES, UNCOMPLICATED: ICD-10-CM

## 2025-06-30 DIAGNOSIS — Z72.0 TOBACCO ABUSE: ICD-10-CM

## 2025-06-30 PROCEDURE — 71271 CT THORAX LUNG CANCER SCR C-: CPT

## 2025-07-25 ENCOUNTER — LAB (OUTPATIENT)
Age: 60
End: 2025-07-25
Payer: COMMERCIAL

## 2025-07-25 ENCOUNTER — OFFICE VISIT (OUTPATIENT)
Age: 60
End: 2025-07-25
Payer: COMMERCIAL

## 2025-07-25 VITALS
HEART RATE: 81 BPM | OXYGEN SATURATION: 96 % | HEIGHT: 76 IN | SYSTOLIC BLOOD PRESSURE: 124 MMHG | BODY MASS INDEX: 19.96 KG/M2 | WEIGHT: 163.9 LBS | DIASTOLIC BLOOD PRESSURE: 82 MMHG

## 2025-07-25 DIAGNOSIS — M25.551 CHRONIC PAIN OF RIGHT HIP: ICD-10-CM

## 2025-07-25 DIAGNOSIS — K21.9 GASTROESOPHAGEAL REFLUX DISEASE, UNSPECIFIED WHETHER ESOPHAGITIS PRESENT: ICD-10-CM

## 2025-07-25 DIAGNOSIS — K21.9 GASTROESOPHAGEAL REFLUX DISEASE, UNSPECIFIED WHETHER ESOPHAGITIS PRESENT: Primary | ICD-10-CM

## 2025-07-25 DIAGNOSIS — G89.29 CHRONIC PAIN OF RIGHT HIP: ICD-10-CM

## 2025-07-25 DIAGNOSIS — R63.4 WEIGHT LOSS: ICD-10-CM

## 2025-07-25 DIAGNOSIS — F41.9 ANXIETY: ICD-10-CM

## 2025-07-25 DIAGNOSIS — R11.2 NAUSEA AND VOMITING, UNSPECIFIED VOMITING TYPE: ICD-10-CM

## 2025-07-25 LAB
ALBUMIN SERPL-MCNC: 4.3 G/DL (ref 3.5–5.2)
ALBUMIN/GLOB SERPL: 1.4 G/DL
ALP SERPL-CCNC: 106 U/L (ref 39–117)
ALT SERPL W P-5'-P-CCNC: 25 U/L (ref 1–41)
ANION GAP SERPL CALCULATED.3IONS-SCNC: 10 MMOL/L (ref 5–15)
AST SERPL-CCNC: 32 U/L (ref 1–40)
BILIRUB SERPL-MCNC: 0.3 MG/DL (ref 0–1.2)
BUN SERPL-MCNC: 5 MG/DL (ref 6–20)
BUN/CREAT SERPL: 6.8 (ref 7–25)
CALCIUM SPEC-SCNC: 9.5 MG/DL (ref 8.6–10.5)
CHLORIDE SERPL-SCNC: 104 MMOL/L (ref 98–107)
CHOLEST SERPL-MCNC: 207 MG/DL (ref 0–200)
CO2 SERPL-SCNC: 28 MMOL/L (ref 22–29)
CREAT SERPL-MCNC: 0.74 MG/DL (ref 0.76–1.27)
DEPRECATED RDW RBC AUTO: 43 FL (ref 37–54)
EGFRCR SERPLBLD CKD-EPI 2021: 105 ML/MIN/1.73
ERYTHROCYTE [DISTWIDTH] IN BLOOD BY AUTOMATED COUNT: 12.9 % (ref 12.3–15.4)
GLOBULIN UR ELPH-MCNC: 3.1 GM/DL
GLUCOSE SERPL-MCNC: 91 MG/DL (ref 65–99)
HCT VFR BLD AUTO: 43.9 % (ref 37.5–51)
HDLC SERPL-MCNC: 96 MG/DL (ref 40–60)
HGB BLD-MCNC: 14.8 G/DL (ref 13–17.7)
LDLC SERPL CALC-MCNC: 93 MG/DL (ref 0–100)
LDLC/HDLC SERPL: 0.94 {RATIO}
MCH RBC QN AUTO: 31 PG (ref 26.6–33)
MCHC RBC AUTO-ENTMCNC: 33.7 G/DL (ref 31.5–35.7)
MCV RBC AUTO: 91.8 FL (ref 79–97)
PLATELET # BLD AUTO: 236 10*3/MM3 (ref 140–450)
PMV BLD AUTO: 10.3 FL (ref 6–12)
POTASSIUM SERPL-SCNC: 4.7 MMOL/L (ref 3.5–5.2)
PROT SERPL-MCNC: 7.4 G/DL (ref 6–8.5)
RBC # BLD AUTO: 4.78 10*6/MM3 (ref 4.14–5.8)
SODIUM SERPL-SCNC: 142 MMOL/L (ref 136–145)
TRIGL SERPL-MCNC: 102 MG/DL (ref 0–150)
TSH SERPL DL<=0.05 MIU/L-ACNC: 1.53 UIU/ML (ref 0.27–4.2)
VLDLC SERPL-MCNC: 18 MG/DL (ref 5–40)
WBC NRBC COR # BLD AUTO: 7.01 10*3/MM3 (ref 3.4–10.8)

## 2025-07-25 PROCEDURE — 36415 COLL VENOUS BLD VENIPUNCTURE: CPT | Performed by: NURSE PRACTITIONER

## 2025-07-25 PROCEDURE — 99214 OFFICE O/P EST MOD 30 MIN: CPT | Performed by: NURSE PRACTITIONER

## 2025-07-25 PROCEDURE — 1125F AMNT PAIN NOTED PAIN PRSNT: CPT | Performed by: NURSE PRACTITIONER

## 2025-07-25 PROCEDURE — 80053 COMPREHEN METABOLIC PANEL: CPT | Performed by: NURSE PRACTITIONER

## 2025-07-25 PROCEDURE — 83036 HEMOGLOBIN GLYCOSYLATED A1C: CPT | Performed by: NURSE PRACTITIONER

## 2025-07-25 PROCEDURE — 80061 LIPID PANEL: CPT | Performed by: NURSE PRACTITIONER

## 2025-07-25 PROCEDURE — 84443 ASSAY THYROID STIM HORMONE: CPT | Performed by: NURSE PRACTITIONER

## 2025-07-25 PROCEDURE — 85027 COMPLETE CBC AUTOMATED: CPT | Performed by: NURSE PRACTITIONER

## 2025-07-25 RX ORDER — ESCITALOPRAM OXALATE 5 MG/1
5 TABLET ORAL DAILY
Qty: 90 TABLET | Refills: 0 | Status: SHIPPED | OUTPATIENT
Start: 2025-07-25

## 2025-07-25 RX ORDER — ONDANSETRON 4 MG/1
4 TABLET, ORALLY DISINTEGRATING ORAL EVERY 8 HOURS PRN
Qty: 18 TABLET | Refills: 0 | Status: SHIPPED | OUTPATIENT
Start: 2025-07-25

## 2025-07-25 NOTE — PROGRESS NOTES
Chief Complaint  Anxiety, Nausea, and Hip Pain (R)    Subjective        Stan Israel Jr. presents to [facility]    Follow up visit  Symptoms include: nasal congestion.   Pertinent negative symptoms include no abdominal pain, no anorexia, no joint pain, no change in stool, no chest pain, no chills and no cough.       History of Present Illness  The patient presents for a follow-up visit.    He reports that his current medication regimen, taken an hour before bedtime, is effective in managing his symptoms. He believes the dosage is appropriate and beneficial. He is seeking refills for Lexapro and Zofran. He has not needed to use Zofran since his last prescription. He has a sufficient supply of Prilosec and ropinirole. He is also taking Allegra.    He is hoping to schedule hip surgery around 09/01/2025 and needs to arrange an appointment with Dr. Riley to discuss. He plans to undergo the surgery within the next 30 to 45 days. His hip pain is severe enough to interfere with his work. He is considering applying for temporary disability benefits due to his condition. He is also requesting a note for an extra mat during his upcoming 30 day incarceration. He anticipates returning to light duty work after his surgery. He has been using hydroxyzine for pain management, which provides some relief but causes discomfort when he changes positions at night.    He has been experiencing stomach issues, including dry heaving, which he attributes to heat or potential bleeding ulcers. He has not consumed alcohol or food recently. He has noticed weight loss, which he believes is due to his stomach problems.    Social History:  Occupations: Sales, stock shelves, , , counselor  Alcohol: No recent consumption  Sleep: Sleeps well with current medication, but experiences discomfort when changing positions    Results       Objective   Vital Signs:  /82 (BP Location: Right  "arm, Patient Position: Sitting, Cuff Size: Adult)   Pulse 81   Ht 193.4 cm (76.14\")   Wt 74.3 kg (163 lb 14.4 oz)   SpO2 96%   BMI 19.88 kg/m²   Estimated body mass index is 19.88 kg/m² as calculated from the following:    Height as of this encounter: 193.4 cm (76.14\").    Weight as of this encounter: 74.3 kg (163 lb 14.4 oz).    BMI is within normal parameters. No other follow-up for BMI required.    Physical Exam  Vitals reviewed.   Constitutional:       Appearance: Normal appearance.   HENT:      Nose: Nose normal.      Mouth/Throat:      Mouth: Mucous membranes are moist.      Pharynx: Oropharynx is clear.   Eyes:      Conjunctiva/sclera: Conjunctivae normal.   Cardiovascular:      Rate and Rhythm: Normal rate and regular rhythm.      Heart sounds: Normal heart sounds.   Pulmonary:      Effort: Pulmonary effort is normal.      Breath sounds: Normal breath sounds.   Musculoskeletal:      Cervical back: Normal range of motion.      Right hip: Tenderness and bony tenderness present. Decreased range of motion. Decreased strength.   Skin:     General: Skin is warm.   Neurological:      Mental Status: He is alert and oriented to person, place, and time.      Gait: Gait abnormal.   Psychiatric:         Mood and Affect: Mood normal.         Behavior: Behavior normal.         Thought Content: Thought content normal.        Result Review :                  Assessment and Plan   Diagnoses and all orders for this visit:    1. Gastroesophageal reflux disease, unspecified whether esophagitis present (Primary)  -     CBC (No Diff); Future  -     Comprehensive Metabolic Panel; Future    2. Nausea and vomiting, unspecified vomiting type  -     ondansetron ODT (ZOFRAN-ODT) 4 MG disintegrating tablet; Place 1 tablet on the tongue Every 8 (Eight) Hours As Needed for Nausea.  Dispense: 18 tablet; Refill: 0  -     Ambulatory referral for Screening EGD    3. Anxiety  -     escitalopram (Lexapro) 5 MG tablet; Take 1 tablet by " mouth Daily.  Dispense: 90 tablet; Refill: 0    4. Weight loss  -     CBC (No Diff); Future  -     Lipid Panel; Future  -     TSH Rfx On Abnormal To Free T4; Future  -     Hemoglobin A1c; Future    5. Chronic pain of right hip      Assessment & Plan  1. Hip pain.  - Reports significant hip pain affecting job performance.  - Will contact Dr. Riley to schedule an injection and subsequent surgery within the next 30-45 days.  - Note for temporary disability will be provided, and he will be on work release until 08/05/2025, with a return to light duty work on 08/04/2025.  - If permanent disability is needed, will need to see a disability physician.    2. Nausea and vomiting.  - Experiences dry heaving and nausea, possibly due to gastrointestinal issues.  - An EGD will be scheduled to investigate further.  - Prescription for Zofran will be sent to pharmacy.    3. Medication management.  - Needs a refill for Lexapro.  - A 90-day supply of Lexapro will be sent to pharmacy.  - Reports having sufficient supply of other medications including ropinirole and omeprazole.    4. Health maintenance.  - Laboratory tests will be conducted today.  - Physical examination is scheduled for 3 months from now.    Follow-up: Physical examination scheduled for 3 months from now.       The following portions of the patient's history were reviewed and updated as appropriate: allergies, current medications, past family history, past medical history, past social history, past surgical history, and problem list.       Follow Up   Return in about 3 months (around 10/25/2025) for Annual.  Patient was given instructions and counseling regarding his condition or for health maintenance advice. Please see specific information pulled into the AVS if appropriate.         Patient or patient representative verbalized consent for the use of Ambient Listening during the visit with  ROSALIO Dash for chart documentation. 7/25/2025  10:49 EDT

## 2025-07-26 LAB — HBA1C MFR BLD: 4.9 % (ref 4.8–5.6)

## 2025-08-18 ENCOUNTER — LAB REQUISITION (OUTPATIENT)
Dept: LAB | Facility: HOSPITAL | Age: 60
End: 2025-08-18
Payer: COMMERCIAL

## 2025-08-18 ENCOUNTER — PRIOR AUTHORIZATION (OUTPATIENT)
Dept: GASTROENTEROLOGY | Facility: CLINIC | Age: 60
End: 2025-08-18
Payer: COMMERCIAL

## 2025-08-18 ENCOUNTER — OUTSIDE FACILITY SERVICE (OUTPATIENT)
Dept: GASTROENTEROLOGY | Facility: CLINIC | Age: 60
End: 2025-08-18
Payer: COMMERCIAL

## 2025-08-18 DIAGNOSIS — R11.2 NAUSEA WITH VOMITING, UNSPECIFIED: ICD-10-CM

## 2025-08-18 PROCEDURE — 43239 EGD BIOPSY SINGLE/MULTIPLE: CPT | Performed by: INTERNAL MEDICINE

## 2025-08-18 PROCEDURE — 88305 TISSUE EXAM BY PATHOLOGIST: CPT | Performed by: INTERNAL MEDICINE

## 2025-08-20 ENCOUNTER — RESULTS FOLLOW-UP (OUTPATIENT)
Dept: LAB | Facility: HOSPITAL | Age: 60
End: 2025-08-20
Payer: COMMERCIAL

## 2025-08-20 LAB
CYTO UR: NORMAL
LAB AP CASE REPORT: NORMAL
LAB AP CLINICAL INFORMATION: NORMAL
PATH REPORT.FINAL DX SPEC: NORMAL
PATH REPORT.GROSS SPEC: NORMAL